# Patient Record
Sex: FEMALE | Race: WHITE | Employment: OTHER | URBAN - METROPOLITAN AREA
[De-identification: names, ages, dates, MRNs, and addresses within clinical notes are randomized per-mention and may not be internally consistent; named-entity substitution may affect disease eponyms.]

---

## 2019-05-24 ENCOUNTER — TRANSFERRED RECORDS (OUTPATIENT)
Dept: HEALTH INFORMATION MANAGEMENT | Facility: CLINIC | Age: 59
End: 2019-05-24

## 2019-05-29 ENCOUNTER — TRANSFERRED RECORDS (OUTPATIENT)
Dept: HEALTH INFORMATION MANAGEMENT | Facility: CLINIC | Age: 59
End: 2019-05-29

## 2020-08-28 ENCOUNTER — TRANSFERRED RECORDS (OUTPATIENT)
Dept: HEALTH INFORMATION MANAGEMENT | Facility: CLINIC | Age: 60
End: 2020-08-28

## 2020-09-16 ENCOUNTER — TELEPHONE (OUTPATIENT)
Dept: TRANSPLANT | Facility: CLINIC | Age: 60
End: 2020-09-16

## 2020-09-16 NOTE — TELEPHONE ENCOUNTER
Is abo B-unsure of recip's yet-recip in early referral stage.Full sibling. Lives in [place].Hx Breast CA 2013 s/p XRT no chemo. Has been taking Anastrozole.Had Lumpectomy/no Mastectomy. Will need records.Per Dr Mckee she's OK to cont process.Will at this time send info/forms so she can sign CHAGO.

## 2020-09-16 NOTE — TELEPHONE ENCOUNTER
"[facility] BREEZE  [id number]      LIVING KIDNEY DONOR EVALUATION  Donor First Name Lucero Donor MRN    Donor Last Name Javier Completed 9/10/2020 6:47 PM    1960 Record ID [id number]   BREEZE Screen PASSED     Intended Recipient  Recipient First Name Marybeth Recipient MRN    Recipient Last Name Martin Relationship Full Sibling   Recipient  1959-10-10 Recipient Diagnosis    Recipient's ABO      Donor Information  Age 60 Gender Female   Ht 157 cm (5' 2'') Race    Wt 64.8 kg (143 lbs) Ethnicity Not /   BMI 26.20 kg/m  Preferred Language English      Required No     Blood Type B   Demographics  Home Address [address] [place] [address] # [phone]   [facility] #    [facility] Code [id number] Type    Country [place] Preferred Contact day Tue   Email [email or web address] Preferred Contact time 09:00 AM-11:00 AM   &&   Donor's Medical Information  Medical History Allergic Rhinitis   Breast Cancer dx  s/p XRT no Chemo   Carpal Tunnel Syndrome   History of pregnancy   Insomnia   Joint Pain/Arthritis NOS   Menopause   Ovarian Cysts   other (\"GI issues\") Medications Anastrozole   Cetirizine   Fish Oil   Glucosamine   Melatonin Tablet   Multivitamin   Probiotics   Viactiv   Surgical History Biopsy, Left Breast   Bunionectomy, Right   Carpal Tunnel Repair, Left   Dilation and Curettage   Lumpectomy, Left Breast   Ovarian Cystectomy Allergies NKDA   Social History EtOH: Rare (1-2 drinks/month)   Illicit Drug Use: Denies   Tobacco: Denies Self-Reported Functional Status \"I am able to participate in strenuous sports such as swimming, singles tennis, football, basketball, or skiing\"   Family Medical History Cancer (denies)   Diabetes (Sibling)   Heart Disease (Mother)   Hypertension (Mother, Sibling)   Kidney Disease (Sibling, Mother)   Kidney Stones (denies) Exercise Frequency Exercise (>3 times per week)   Review of Organ Systems  Review " of Systems Airway or Lungs: No   Blood Disorder: No   Cancer: Yes   Diabetes,Thyroid,Adrenal,Endocrine Disorder: No   Digestive or Liver: No   Female Health: Yes   Heart or Circulatory System: No   Immune Diseases: No   Kidneys and Bladder: No   Muscles,Bones,Joints: Yes   Neuro: No   Psych: No   &&   Donor's Social Information  Marital Status  Living Accommodation Owns own home/apartment   Level of Education College or baccalaureate degree complete Living Arrangement With spouse   Employment Status Full Time Concerns: health and life insurance No   Employer Self Concerns: job security and lost income No   Occupation      Medical Insurance Status Has medical insurance     High Risk Behavior  High Risk Behaviors Blood transfusion < 12 months. (NO)   Commercial sex < 12 months. (NO)   Illicit IV drug use < 5yrs. (NO)   Other high risk sexual contact < 12 months. (NO)   Reason for Donation  Referral Tx Candidate Reason for Donation to help my sister   Permission to Disclose Inquiry Yes Patient Comments    Donor Motivation Level Highly motivated donor     PCP Contact  PCP Name Trace Beard MD   PCP [facility]   PCP Phone [phone]   Emergency Contact  First Name Jeremi First Name Nichole   Last Name Javier Last Name Manfred   Phone # [phone] Phone # [phone]   Phone Type Mobile Phone Type Mobile   Relationship Spouse Relationship Friend or Other   Office Use  Reviewed By    Reviewed 9/11/2020 8:36 AM   Admin Folder Accept   Comments    Lost for Followup    Extended Comments    BREEZE ID [id number] survey status completed   Activity History  Call  Due Date 9/15/2020   Last Modified Date/Time 9/15/2020 2:41 PM   Comments    Call  Due Date 9/15/2020   Last Modified Date/Time 9/15/2020 11:57 AM   Comments

## 2020-09-17 ENCOUNTER — MEDICAL CORRESPONDENCE (OUTPATIENT)
Dept: HEALTH INFORMATION MANAGEMENT | Facility: CLINIC | Age: 60
End: 2020-09-17

## 2020-12-02 ENCOUNTER — TELEPHONE (OUTPATIENT)
Dept: TRANSPLANT | Facility: CLINIC | Age: 60
End: 2020-12-02

## 2021-02-11 DIAGNOSIS — Z00.5 TRANSPLANT DONOR EVALUATION: Primary | ICD-10-CM

## 2021-02-23 ENCOUNTER — TRANSFERRED RECORDS (OUTPATIENT)
Dept: HEALTH INFORMATION MANAGEMENT | Facility: CLINIC | Age: 61
End: 2021-02-23

## 2021-03-16 ENCOUNTER — DOCUMENTATION ONLY (OUTPATIENT)
Dept: TRANSPLANT | Facility: CLINIC | Age: 61
End: 2021-03-16

## 2021-03-23 DIAGNOSIS — Z00.5 TRANSPLANT DONOR EVALUATION: Primary | ICD-10-CM

## 2021-04-01 DIAGNOSIS — Z00.5 TRANSPLANT DONOR EVALUATION: ICD-10-CM

## 2021-04-01 DIAGNOSIS — Z00.5 TRANSPLANT DONOR EVALUATION: Primary | ICD-10-CM

## 2021-04-02 DIAGNOSIS — Z00.5 TRANSPLANT DONOR EVALUATION: ICD-10-CM

## 2021-04-08 LAB
A*: NORMAL
A*LOCUS SEROLOGIC EQUIVALENT: 2
A*LOCUS: NORMAL
A*SEROLOGIC EQUIVALENT: 32
AB TEST METHOD: NORMAL
B*: NORMAL
B*LOCUS SEROLOGIC EQUIVALENT: 7
B*LOCUS: NORMAL
B*SEROLOGIC EQUIVALENT: 38
BW-1: NORMAL
BW-2: NORMAL
C*: NORMAL
C*LOCUS SEROLOGIC EQUIVALENT: 7
C*LOCUS: NORMAL
C*SEROLOGIC EQUIVALENT: 12
DPA1*: NORMAL
DPB1*: NORMAL
DPB1*LOCUS NMDP: NORMAL
DPB1*LOCUS: NORMAL
DPB1*NMDP: NORMAL
DQA1*: NORMAL
DQA1*LOCUS: NORMAL
DQB1*: NORMAL
DQB1*LOCUS SEROLOGIC EQUIVALENT: 7
DQB1*LOCUS: NORMAL
DQB1*SEROLOGIC EQUIVALENT: 5
DRB1*: NORMAL
DRB1*LOCUS SEROLOGIC EQUIVALENT: 11
DRB1*LOCUS: NORMAL
DRB1*SEROLOGIC EQUIVALENT: 1
DRB3*LOCUS SEROLOGIC EQUIVALENT: 52
DRB3*LOCUS: NORMAL
DRSSO TEST METHOD: NORMAL

## 2021-04-14 DIAGNOSIS — Z00.5 TRANSPLANT DONOR EVALUATION: Primary | ICD-10-CM

## 2021-04-28 ENCOUNTER — TRANSFERRED RECORDS (OUTPATIENT)
Dept: HEALTH INFORMATION MANAGEMENT | Facility: CLINIC | Age: 61
End: 2021-04-28

## 2021-05-05 ENCOUNTER — DOCUMENTATION ONLY (OUTPATIENT)
Dept: TRANSPLANT | Facility: CLINIC | Age: 61
End: 2021-05-05

## 2021-05-17 ENCOUNTER — TELEPHONE (OUTPATIENT)
Dept: TRANSPLANT | Facility: CLINIC | Age: 61
End: 2021-05-17

## 2021-05-17 NOTE — TELEPHONE ENCOUNTER
JEFFERSON for Johana.CrCl OK(101)per orders recip coordinator wait with further testing.Recip has appt here in 2 wks and will let us know if we should cont.

## 2021-06-03 ENCOUNTER — TELEPHONE (OUTPATIENT)
Dept: TRANSPLANT | Facility: CLINIC | Age: 61
End: 2021-06-03

## 2021-06-03 DIAGNOSIS — Z00.5 TRANSPLANT DONOR EVALUATION: Primary | ICD-10-CM

## 2021-06-03 RX ORDER — ALBUTEROL SULFATE 0.83 MG/ML
2.5 SOLUTION RESPIRATORY (INHALATION)
Status: CANCELLED | OUTPATIENT
Start: 2021-07-02

## 2021-06-03 RX ORDER — METHYLPREDNISOLONE SODIUM SUCCINATE 125 MG/2ML
125 INJECTION, POWDER, LYOPHILIZED, FOR SOLUTION INTRAMUSCULAR; INTRAVENOUS
Status: CANCELLED
Start: 2021-07-02

## 2021-06-03 RX ORDER — ALBUTEROL SULFATE 90 UG/1
1-2 AEROSOL, METERED RESPIRATORY (INHALATION)
Status: CANCELLED
Start: 2021-07-02

## 2021-06-03 RX ORDER — EPINEPHRINE 1 MG/ML
0.3 INJECTION, SOLUTION, CONCENTRATE INTRAVENOUS EVERY 5 MIN PRN
Status: CANCELLED | OUTPATIENT
Start: 2021-07-02

## 2021-06-03 RX ORDER — MEPERIDINE HYDROCHLORIDE 25 MG/ML
25 INJECTION INTRAMUSCULAR; INTRAVENOUS; SUBCUTANEOUS EVERY 30 MIN PRN
Status: CANCELLED | OUTPATIENT
Start: 2021-07-02

## 2021-06-03 RX ORDER — NALOXONE HYDROCHLORIDE 0.4 MG/ML
0.2 INJECTION, SOLUTION INTRAMUSCULAR; INTRAVENOUS; SUBCUTANEOUS
Status: CANCELLED | OUTPATIENT
Start: 2021-07-02

## 2021-06-03 RX ORDER — DIPHENHYDRAMINE HYDROCHLORIDE 50 MG/ML
50 INJECTION INTRAMUSCULAR; INTRAVENOUS
Status: CANCELLED
Start: 2021-07-02

## 2021-06-04 DIAGNOSIS — Z00.5 TRANSPLANT DONOR EVALUATION: Primary | ICD-10-CM

## 2021-06-09 ENCOUNTER — TRANSFERRED RECORDS (OUTPATIENT)
Dept: HEALTH INFORMATION MANAGEMENT | Facility: CLINIC | Age: 61
End: 2021-06-09

## 2021-06-20 ENCOUNTER — HEALTH MAINTENANCE LETTER (OUTPATIENT)
Age: 61
End: 2021-06-20

## 2021-06-29 ENCOUNTER — TELEPHONE (OUTPATIENT)
Dept: TRANSPLANT | Facility: CLINIC | Age: 61
End: 2021-06-29

## 2021-06-29 NOTE — TELEPHONE ENCOUNTER
Talked with Salome on the phone regarding donor education and evaluation of a living kidney donor.  I reviewed donor consent.  I answered all questions.  I will send donor consent and three ROIs for signatures so that we can retrieve medical records regarding her history of breast cancer, her most recent pap and most recent colonoscopy.  Has offered to be donor to biological sister who is 18 months younger.  Salome is open to PEP  Discussed surgery hospitalization and recovery.  Know when and how to obtain evaluation results and the process for scheduling surgery.  [facility].  Donor was Provided Living Donor Collective (LDC) Materials? Yes  Donor has agreed to be contacted by [facility] for follow-up LDC Questions? Yes  Requested that Donor consent be sent via [place]  Requested three CHAGO's be sent to Salome  Will request  routine cancer screening tests once we receive CHAGO's   Questions answered.  Approx. time spent:  30 minutes  Donor has medical insurance:  Yes    Salome lives in [place].  She had breast cancer in 2013. Neg BRCA according to donor.    She is on Anastrozole for 10 years.  She has yearly follow ups with her oncology team.  She is due in September

## 2021-07-01 ENCOUNTER — TELEPHONE (OUTPATIENT)
Dept: TRANSPLANT | Facility: CLINIC | Age: 61
End: 2021-07-01

## 2021-07-02 ENCOUNTER — TELEPHONE (OUTPATIENT)
Dept: TRANSPLANT | Facility: CLINIC | Age: 61
End: 2021-07-02

## 2021-07-02 NOTE — TELEPHONE ENCOUNTER
Please RESCHEDULE for kidney donor eval for 7/16/21 slot 2.Will have Iohexol and labs that day at [facility] lab.COVID test outside clinic. Her orders are in from prev.She has MyChart.

## 2021-07-14 ENCOUNTER — TELEPHONE (OUTPATIENT)
Dept: TRANSPLANT | Facility: CLINIC | Age: 61
End: 2021-07-14

## 2021-07-14 NOTE — TELEPHONE ENCOUNTER
Outreach call made to Nahomi to review plan for her evaluation. Evaluation process briefly reviewed. Nahomi verbalized understanding and comfort with plan. She has travel arrangements in place for today. Nahomi confirmed he had her pre-procedure COVID testing done and is bringing the results.

## 2021-07-15 DIAGNOSIS — Z00.5 TRANSPLANT DONOR EVALUATION: ICD-10-CM

## 2021-07-15 NOTE — TELEPHONE ENCOUNTER
Nahomi called to verify okay to bring her sister as a support person to her evaluation, sister is her intended recipient. Told Nahomi okay to have her sister if she is her desired support person, and informed her that her sister may be asked to step out of the room for portions of visits. Nahomi verbalized understanding, denied further questions.

## 2021-07-16 ENCOUNTER — APPOINTMENT (OUTPATIENT)
Dept: TRANSPLANT | Facility: CLINIC | Age: 61
End: 2021-07-16
Attending: TRANSPLANT SURGERY

## 2021-07-16 ENCOUNTER — OFFICE VISIT (OUTPATIENT)
Dept: NEPHROLOGY | Facility: CLINIC | Age: 61
End: 2021-07-16
Attending: TRANSPLANT SURGERY

## 2021-07-16 ENCOUNTER — LAB (OUTPATIENT)
Dept: LAB | Facility: CLINIC | Age: 61
End: 2021-07-16

## 2021-07-16 ENCOUNTER — ALLIED HEALTH/NURSE VISIT (OUTPATIENT)
Dept: TRANSPLANT | Facility: CLINIC | Age: 61
End: 2021-07-16
Attending: TRANSPLANT SURGERY

## 2021-07-16 ENCOUNTER — LAB (OUTPATIENT)
Dept: LAB | Facility: CLINIC | Age: 61
End: 2021-07-16
Attending: INTERNAL MEDICINE

## 2021-07-16 ENCOUNTER — ANCILLARY PROCEDURE (OUTPATIENT)
Dept: CARDIOLOGY | Facility: CLINIC | Age: 61
End: 2021-07-16
Attending: INTERNAL MEDICINE

## 2021-07-16 ENCOUNTER — OFFICE VISIT (OUTPATIENT)
Dept: INFUSION THERAPY | Facility: CLINIC | Age: 61
End: 2021-07-16
Attending: INTERNAL MEDICINE

## 2021-07-16 ENCOUNTER — ANCILLARY PROCEDURE (OUTPATIENT)
Dept: CT IMAGING | Facility: CLINIC | Age: 61
End: 2021-07-16
Attending: INTERNAL MEDICINE

## 2021-07-16 ENCOUNTER — ANCILLARY PROCEDURE (OUTPATIENT)
Dept: GENERAL RADIOLOGY | Facility: CLINIC | Age: 61
End: 2021-07-16
Attending: INTERNAL MEDICINE

## 2021-07-16 ENCOUNTER — OFFICE VISIT (OUTPATIENT)
Dept: TRANSPLANT | Facility: CLINIC | Age: 61
End: 2021-07-16
Attending: TRANSPLANT SURGERY

## 2021-07-16 VITALS
TEMPERATURE: 97 F | RESPIRATION RATE: 16 BRPM | OXYGEN SATURATION: 96 % | HEART RATE: 55 BPM | SYSTOLIC BLOOD PRESSURE: 158 MMHG | DIASTOLIC BLOOD PRESSURE: 88 MMHG | WEIGHT: 150.7 LBS

## 2021-07-16 VITALS — BODY MASS INDEX: 27.6 KG/M2 | WEIGHT: 150 LBS | HEART RATE: 47 BPM | HEIGHT: 62 IN | OXYGEN SATURATION: 100 %

## 2021-07-16 DIAGNOSIS — Z00.5 EXAMINATION OF POTENTIAL DONOR OF ORGAN AND TISSUE: Primary | ICD-10-CM

## 2021-07-16 DIAGNOSIS — Z00.5 TRANSPLANT DONOR EVALUATION: ICD-10-CM

## 2021-07-16 DIAGNOSIS — Z00.5 TRANSPLANT DONOR EVALUATION: Primary | ICD-10-CM

## 2021-07-16 DIAGNOSIS — Z00.5 EXAMINATION OF POTENTIAL DONOR OF ORGAN AND TISSUE: ICD-10-CM

## 2021-07-16 DIAGNOSIS — C50.912 MALIGNANT NEOPLASM OF LEFT BREAST IN FEMALE, ESTROGEN RECEPTOR POSITIVE, UNSPECIFIED SITE OF BREAST (H): ICD-10-CM

## 2021-07-16 DIAGNOSIS — R03.0 ELEVATED BLOOD PRESSURE READING: ICD-10-CM

## 2021-07-16 DIAGNOSIS — Z01.818 ENCOUNTER FOR PREOPERATIVE EVALUATION OF LIVING DONOR OF KIDNEY: Primary | ICD-10-CM

## 2021-07-16 DIAGNOSIS — Z17.0 MALIGNANT NEOPLASM OF LEFT BREAST IN FEMALE, ESTROGEN RECEPTOR POSITIVE, UNSPECIFIED SITE OF BREAST (H): ICD-10-CM

## 2021-07-16 DIAGNOSIS — R31.1 BENIGN ESSENTIAL MICROSCOPIC HEMATURIA: ICD-10-CM

## 2021-07-16 LAB
ABO/RH(D): NORMAL
ALBUMIN SERPL-MCNC: 3.7 G/DL (ref 3.4–5)
ALBUMIN UR-MCNC: NEGATIVE MG/DL
ALP SERPL-CCNC: 61 U/L (ref 40–150)
ALT SERPL W P-5'-P-CCNC: 20 U/L (ref 0–50)
ANION GAP SERPL CALCULATED.3IONS-SCNC: 4 MMOL/L (ref 3–14)
ANTIBODY SCREEN: NEGATIVE
APPEARANCE UR: ABNORMAL
APTT PPP: 31 SECONDS (ref 22–38)
AST SERPL W P-5'-P-CCNC: 20 U/L (ref 0–45)
BILIRUB DIRECT SERPL-MCNC: 0.1 MG/DL (ref 0–0.2)
BILIRUB SERPL-MCNC: 0.5 MG/DL (ref 0.2–1.3)
BILIRUB UR QL STRIP: NEGATIVE
BUN SERPL-MCNC: 13 MG/DL (ref 7–30)
CALCIUM SERPL-MCNC: 9.1 MG/DL (ref 8.5–10.1)
CHLORIDE BLD-SCNC: 110 MMOL/L (ref 94–109)
CHOLEST SERPL-MCNC: 201 MG/DL
CO2 SERPL-SCNC: 25 MMOL/L (ref 20–32)
COLOR UR AUTO: YELLOW
CREAT SERPL-MCNC: 0.74 MG/DL (ref 0.52–1.04)
CREAT SERPL-MCNC: 0.75 MG/DL (ref 0.52–1.04)
CREAT UR-MCNC: 106 MG/DL
CREAT UR-MCNC: 106 MG/DL
ERYTHROCYTE [DISTWIDTH] IN BLOOD BY AUTOMATED COUNT: 13.2 % (ref 10–15)
FASTING STATUS PATIENT QL REPORTED: YES
GFR SERPL CREATININE-BSD FRML MDRD: 86 ML/MIN/1.73M2
GFR SERPL CREATININE-BSD FRML MDRD: 87 ML/MIN/1.73M2
GLUCOSE BLD-MCNC: 89 MG/DL (ref 70–99)
GLUCOSE BLD-MCNC: 92 MG/DL (ref 70–99)
GLUCOSE UR STRIP-MCNC: NEGATIVE MG/DL
HBA1C MFR BLD: 5.4 % (ref 0–5.6)
HCT VFR BLD AUTO: 36.8 % (ref 35–47)
HDLC SERPL-MCNC: 67 MG/DL
HGB BLD-MCNC: 12.6 G/DL (ref 11.7–15.7)
HGB BLD-MCNC: 12.6 G/DL (ref 11.7–15.7)
HGB UR QL STRIP: NEGATIVE
INR PPP: 1.08 (ref 0.85–1.15)
KETONES UR STRIP-MCNC: NEGATIVE MG/DL
LDLC SERPL CALC-MCNC: 119 MG/DL
LEUKOCYTE ESTERASE UR QL STRIP: NEGATIVE
LVEF ECHO: NORMAL
MCH RBC QN AUTO: 31.9 PG (ref 26.5–33)
MCHC RBC AUTO-ENTMCNC: 34.4 G/DL (ref 31.5–36.5)
MCV RBC AUTO: 94 FL (ref 78–100)
MICROALBUMIN UR-MCNC: 12 MG/DL
MICROALBUMIN/CREAT UR: 11.32 MG/G CR (ref 0–25)
NITRATE UR QL: NEGATIVE
NONHDLC SERPL-MCNC: 134 MG/DL
PH UR STRIP: 7 [PH] (ref 5–7)
PHOSPHATE SERPL-MCNC: 3.9 MG/DL (ref 2.5–4.5)
PLATELET # BLD AUTO: 205 10E3/UL (ref 150–450)
POTASSIUM BLD-SCNC: 3.6 MMOL/L (ref 3.4–5.3)
PROT SERPL-MCNC: 7.1 G/DL (ref 6.8–8.8)
PROT UR-MCNC: 0.08 G/L
PROT/CREAT 24H UR: 0.08 G/G CR (ref 0–0.2)
RBC # BLD AUTO: 3.92 10E6/UL (ref 3.8–5.2)
RBC URINE: 7 /HPF
SODIUM SERPL-SCNC: 139 MMOL/L (ref 133–144)
SP GR UR STRIP: 1.01 (ref 1–1.03)
SPECIMEN EXPIRATION DATE: NORMAL
TRIGL SERPL-MCNC: 73 MG/DL
URATE SERPL-MCNC: 4.1 MG/DL (ref 2.6–6)
UROBILINOGEN UR STRIP-MCNC: NORMAL MG/DL
WBC # BLD AUTO: 3.3 10E3/UL (ref 4–11)
WBC URINE: <1 /HPF

## 2021-07-16 PROCEDURE — 84156 ASSAY OF PROTEIN URINE: CPT

## 2021-07-16 PROCEDURE — 82040 ASSAY OF SERUM ALBUMIN: CPT

## 2021-07-16 PROCEDURE — 82248 BILIRUBIN DIRECT: CPT

## 2021-07-16 PROCEDURE — 82947 ASSAY GLUCOSE BLOOD QUANT: CPT

## 2021-07-16 PROCEDURE — 86481 TB AG RESPONSE T-CELL SUSP: CPT | Mod: 90 | Performed by: PATHOLOGY

## 2021-07-16 PROCEDURE — 250N000011 HC RX IP 250 OP 636: Performed by: INTERNAL MEDICINE

## 2021-07-16 PROCEDURE — 86900 BLOOD TYPING SEROLOGIC ABO: CPT | Mod: 90 | Performed by: PATHOLOGY

## 2021-07-16 PROCEDURE — 86704 HEP B CORE ANTIBODY TOTAL: CPT | Mod: 90 | Performed by: PATHOLOGY

## 2021-07-16 PROCEDURE — 99245 OFF/OP CONSLTJ NEW/EST HI 55: CPT

## 2021-07-16 PROCEDURE — 81001 URINALYSIS AUTO W/SCOPE: CPT | Performed by: PATHOLOGY

## 2021-07-16 PROCEDURE — 86803 HEPATITIS C AB TEST: CPT | Mod: 90 | Performed by: PATHOLOGY

## 2021-07-16 PROCEDURE — 87340 HEPATITIS B SURFACE AG IA: CPT | Mod: 90 | Performed by: PATHOLOGY

## 2021-07-16 PROCEDURE — 74174 CTA ABD&PLVS W/CONTRAST: CPT | Performed by: RADIOLOGY

## 2021-07-16 PROCEDURE — 71046 X-RAY EXAM CHEST 2 VIEWS: CPT | Mod: GC | Performed by: RADIOLOGY

## 2021-07-16 PROCEDURE — 36415 COLL VENOUS BLD VENIPUNCTURE: CPT | Performed by: PATHOLOGY

## 2021-07-16 PROCEDURE — 86901 BLOOD TYPING SEROLOGIC RH(D): CPT | Mod: 90 | Performed by: PATHOLOGY

## 2021-07-16 PROCEDURE — 86644 CMV ANTIBODY: CPT | Mod: 90 | Performed by: PATHOLOGY

## 2021-07-16 PROCEDURE — G0463 HOSPITAL OUTPT CLINIC VISIT: HCPCS | Mod: 25,27

## 2021-07-16 PROCEDURE — 84550 ASSAY OF BLOOD/URIC ACID: CPT

## 2021-07-16 PROCEDURE — 85730 THROMBOPLASTIN TIME PARTIAL: CPT | Performed by: PATHOLOGY

## 2021-07-16 PROCEDURE — 36415 COLL VENOUS BLD VENIPUNCTURE: CPT

## 2021-07-16 PROCEDURE — 80061 LIPID PANEL: CPT

## 2021-07-16 PROCEDURE — 93306 TTE W/DOPPLER COMPLETE: CPT | Mod: GC | Performed by: INTERNAL MEDICINE

## 2021-07-16 PROCEDURE — 86850 RBC ANTIBODY SCREEN: CPT | Mod: 90 | Performed by: PATHOLOGY

## 2021-07-16 PROCEDURE — 85027 COMPLETE CBC AUTOMATED: CPT

## 2021-07-16 PROCEDURE — 86780 TREPONEMA PALLIDUM: CPT | Mod: 90 | Performed by: PATHOLOGY

## 2021-07-16 PROCEDURE — 96374 THER/PROPH/DIAG INJ IV PUSH: CPT

## 2021-07-16 PROCEDURE — G0463 HOSPITAL OUTPT CLINIC VISIT: HCPCS | Mod: 25

## 2021-07-16 PROCEDURE — 86706 HEP B SURFACE ANTIBODY: CPT | Mod: 90 | Performed by: PATHOLOGY

## 2021-07-16 PROCEDURE — 81001 URINALYSIS AUTO W/SCOPE: CPT

## 2021-07-16 PROCEDURE — 83036 HEMOGLOBIN GLYCOSYLATED A1C: CPT

## 2021-07-16 PROCEDURE — 82043 UR ALBUMIN QUANTITATIVE: CPT

## 2021-07-16 PROCEDURE — 99204 OFFICE O/P NEW MOD 45 MIN: CPT | Performed by: TRANSPLANT SURGERY

## 2021-07-16 PROCEDURE — 84100 ASSAY OF PHOSPHORUS: CPT

## 2021-07-16 PROCEDURE — 85610 PROTHROMBIN TIME: CPT | Performed by: PATHOLOGY

## 2021-07-16 PROCEDURE — 86665 EPSTEIN-BARR CAPSID VCA: CPT | Mod: 90 | Performed by: PATHOLOGY

## 2021-07-16 PROCEDURE — 82565 ASSAY OF CREATININE: CPT

## 2021-07-16 RX ORDER — METHYLPREDNISOLONE SODIUM SUCCINATE 125 MG/2ML
125 INJECTION, POWDER, LYOPHILIZED, FOR SOLUTION INTRAMUSCULAR; INTRAVENOUS
Status: CANCELLED
Start: 2021-07-16

## 2021-07-16 RX ORDER — ALBUTEROL SULFATE 90 UG/1
1-2 AEROSOL, METERED RESPIRATORY (INHALATION)
Status: CANCELLED
Start: 2021-07-16

## 2021-07-16 RX ORDER — IOPAMIDOL 755 MG/ML
100 INJECTION, SOLUTION INTRAVASCULAR ONCE
Status: COMPLETED | OUTPATIENT
Start: 2021-07-16 | End: 2021-07-16

## 2021-07-16 RX ORDER — NALOXONE HYDROCHLORIDE 0.4 MG/ML
0.2 INJECTION, SOLUTION INTRAMUSCULAR; INTRAVENOUS; SUBCUTANEOUS
Status: CANCELLED | OUTPATIENT
Start: 2021-07-16

## 2021-07-16 RX ORDER — ALBUTEROL SULFATE 0.83 MG/ML
2.5 SOLUTION RESPIRATORY (INHALATION)
Status: CANCELLED | OUTPATIENT
Start: 2021-07-16

## 2021-07-16 RX ORDER — CHLORAL HYDRATE 500 MG
1 CAPSULE ORAL 2 TIMES DAILY
COMMUNITY

## 2021-07-16 RX ORDER — MEPERIDINE HYDROCHLORIDE 25 MG/ML
25 INJECTION INTRAMUSCULAR; INTRAVENOUS; SUBCUTANEOUS EVERY 30 MIN PRN
Status: CANCELLED | OUTPATIENT
Start: 2021-07-16

## 2021-07-16 RX ORDER — EPINEPHRINE 1 MG/ML
0.3 INJECTION, SOLUTION INTRAMUSCULAR; SUBCUTANEOUS EVERY 5 MIN PRN
Status: CANCELLED | OUTPATIENT
Start: 2021-07-16

## 2021-07-16 RX ORDER — CETIRIZINE HYDROCHLORIDE 5 MG/1
5 TABLET ORAL DAILY
COMMUNITY

## 2021-07-16 RX ORDER — DIPHENHYDRAMINE HYDROCHLORIDE 50 MG/ML
50 INJECTION INTRAMUSCULAR; INTRAVENOUS
Status: CANCELLED
Start: 2021-07-16

## 2021-07-16 RX ADMIN — IOHEXOL 5 ML: 300 INJECTION, SOLUTION INTRAVENOUS at 08:19

## 2021-07-16 RX ADMIN — IOPAMIDOL 100 ML: 755 INJECTION, SOLUTION INTRAVASCULAR at 13:40

## 2021-07-16 ASSESSMENT — MIFFLIN-ST. JEOR: SCORE: 1196.9

## 2021-07-16 ASSESSMENT — PAIN SCALES - GENERAL: PAINLEVEL: NO PAIN (0)

## 2021-07-16 NOTE — NURSING NOTE
Saw Lucero Javier in clinic during kidney donor evaluation.  Her sister Marybeth came with her for this visit.  Has offered to be donor to sister Marybeth Martin.  Compatibility testing was completed, okay for direct donation.    Discussed surgery hospitalization and recovery.  Knows when and how to obtain evaluation results and the process for scheduling surgery.  Has received and reviewed the donor education materials including Living Donor Research synopsis sheet, Donor Shield pamphlet, Informed Consent for Kidney Donor.  Reviewed [facility] datasheet.  Donor was Provided Living Donor Collective (LDC) Materials? Yes  Donor has agreed to be contacted by [facility] for follow-up LDC Questions? Yes  Signed consent for donor evaluation.  Donor signed CHAGO.  Medical history of Breast Cancer 2013, takes Anastrozole.  She had lumpectomy.  No Chemo.    Medical history of heart murmer.  Today there is echocardiogram scheduled.  After seeing the patient Dr Queen would recommend the patient to have a stress test.  Clinic was unable to change procedure today.  Also needed is 24 hour Blood pressure monitor as the patient had  elevated BP's today in clinic.  Requested routine cancer screening tests:  Pap  Mammography  Colonoscopy  Questions answered. Approx. time spent: 20minutes.    Donor has medical insurance Yes   Parking pass given to patient.    Madonna Weber RN  Living Donor Coordinator  07/16/2021 2:06 PM

## 2021-07-16 NOTE — LETTER
7/16/2021         RE: Lucero Herrera  [address] [facility] [address]        Dear Colleague,    Thank you for referring your patient, Lucero Herrera, to the [facility]. Please see a copy of my visit note below.    [facility]  Consult Note     Medical record number: [id number]  YOB: 1960,     Date of Visit:  07/16/2021  Consult requested by the patient for evaluation of kidney donation candidacy.    Assessment and Recommendations: Ms. Herrera appears to be a good candidate for kidney donation at this point in the evaluation. The following issues will need to be addressed prior to formal review:  Iohexal GFR  CT angio  Obtain medical records       The risks of the surgical procedure including but not limited to the rare risk of mortality discussed in detail. Patient verbalized good understanding and had several pertinent questions which were answered.      The majority of our visit today was spent in counseling regarding the medical and surgical risks of kidney donation; the typical lorrie-and post-operative experience and recovery/return to work pattern; restrictions related to the surgery (driving; lifting; exercise).      We also talked about post-op visits and longer term health care maintenance, as well as the implications of having one remaining kidney. This discussion included, but was not limited to rates of complications such as bleeding, infection, need for transfusion, reoperation, other organ injury, future bowel obstruction, incisional hernia, port site pain, venous thrombosis, pulmonary embolism, renal failure, and death (3 per 10,000).     We discussed long-term risks in detail.  I discussed the articles suggesting a small increase in ESKD in donors and their limitations    We discussed recovery, including length of hospital stay; no new meds other than pain meds on discharge; limitations after surgery (no driving for a  couple of weeks; no lifting over 10 lbs or exercise stretching abdominal muscles for 6 weeks; and fatigue for the first few weeks postdonation.  I informed her of our donor survey results on donors feeling ready to drive, and on return to feeling back to normal.  We also discussed the need for maintaining a healthy lifestyle long-term after donation  We discussed the national paired system and the possibility of participating, if not a match for the intended recipient.  I explained how the system worked.    We discussed the increased risk for venous thrombosis for the 1st two postoperative risks.  We discussed that if the family were to drive home in the 1st 2 weeks, the  should stop approximately every 40 minutes and she should get out of the car and walk around for a couple of minutes.    She had numerous questions about the details of the surgical procedure which we discussed in detail.   At the conclusion of the visit, all questions had been answered and Ms. Herrera's candidacy for donation will be reviewed at our Multidisciplinary Donor Selection Committee.     Total time: 45 minutes  Counselling time: 35 minutes        Edison Butler MD  Surgical Director, Kidney Transplantation                                                                                                        ---------------------------------------------------------------------------------------------------    HPI: Ms. Herrera wishes to donate a kidney to sibling.  PMH: breast cancer 2013       Past Medical History:   Diagnosis Date     Arthritis      Breast cancer (H) 2013    Teatment at [place], [place]     CTS (carpal tunnel syndrome)      Heart murmur      Ovarian cyst     benign (per pt)     Past Surgical History:   Procedure Laterality Date     LUMPECTOMY BREAST       OOPHORECTOMY       OVARIAN CYST REMOVAL       Family History   Problem Relation Age of Onset     Cancer Father      Diabetes Sister       Kidney Disease Sister      Social History     Socioeconomic History     Marital status:      Spouse name: Not on file     Number of children: Not on file     Years of education: Not on file     Highest education level: Not on file   Occupational History     Not on file   Tobacco Use     Smoking status: Never Smoker     Smokeless tobacco: Never Used   Substance and Sexual Activity     Alcohol use: Yes     Comment: 2 drinks a week     Drug use: Never     Sexual activity: Not on file   Other Topics Concern     Not on file   Social History Narrative     Not on file     Social Determinants of Health     Financial Resource Strain:      Difficulty of Paying Living Expenses:    Food Insecurity:      Worried About Running Out of Food in the Last Year:      Ran Out of Food in the Last Year:    Transportation Needs:      Lack of Transportation (Medical):      Lack of Transportation (Non-Medical):    Physical Activity:      Days of Exercise per Week:      Minutes of Exercise per Session:    Stress:      Feeling of Stress :    Social Connections:      Frequency of Communication with Friends and Family:      Frequency of Social Gatherings with Friends and Family:      Attends Denominational Services:      Active Member of Clubs or Organizations:      Attends Club or Organization Meetings:      Marital Status:    Intimate Partner Violence:      Fear of Current or Ex-Partner:      Emotionally Abused:      Physically Abused:      Sexually Abused:        ROS:   CONSTITUTIONAL:  No fevers or chills  EYES: negative for icterus  ENT:  negative for hearing loss, tinnitus and sore throat  RESPIRATORY:  negative for cough, sputum, dyspnea  CARDIOVASCULAR:  negative for chest pain  GASTROINTESTINAL:  negative for nausea, vomiting, diarrhea or constipation  GENITOURINARY:  negative for incontinence, dysuria, bladder emptying problems  HEME:  No easy bruising  INTEGUMENT:  negative for rash and pruritus  NEURO:  Negative for headache,  seizure disorder    Allergies:   No Known Allergies    Medications:  Prescription Medications as of 8/2/2021       Rx Number Disp Refills Start End Last Dispensed Date Next Fill Date Owning Pharmacy    ANASTROZOLE PO            Sig: Take by mouth daily    Class: Historical    Route: Oral    calcium-vitamin D-vitamin K (VIACTIV) 500-500-40 MG-UNT-MCG CHEW            Sig: Take 1 tablet by mouth 2 times daily    Class: Historical    Route: Oral    cetirizine (ZYRTEC) 5 MG tablet            Sig: Take 5 mg by mouth daily    Class: Historical    Route: Oral    fish oil-omega-3 fatty acids 1000 MG capsule            Sig: Take 1 g by mouth 2 times daily    Class: Historical    Route: Oral    Glucosamine Sulfate 1500 MG PACK            Sig: Take 1,500 mg by mouth 2 times daily    Class: Historical    Route: Oral    Pediatric Multivitamins-Fl (MULTIVITAMIN WITH 1 MG FLUORIDE) 1 MG CHEW            Sig: Take 1 tablet by mouth daily    Class: Historical    Route: Oral          Labs:   ABO: B POS  Chemistries:   Recent Labs   Lab Test 07/16/21  0729      POTASSIUM 3.6   CHLORIDE 110*   CO2 25   ANIONGAP 4   GLC 89  92   BUN 13   CR 0.75  0.74   SHERIE 9.1       Urine Studies:   Recent Labs   Lab Test 07/16/21  1321 07/16/21  0738   COLOR Straw Yellow   APPEARANCE Clear Slightly Cloudy*   URINEGLC Negative Negative   URINEBILI Negative Negative   URINEKETONE Negative Negative   SG 1.004 1.014   UBLD Negative Negative   URINEPH 7.0 7.0   PROTEIN Negative Negative   NITRITE Negative Negative   LEUKEST Negative Negative   RBCU 2 7*   WBCU 1 <1     Recent Labs   Lab Test 07/16/21  0738   UTPG 0.08   MICROL 12       Hematology:      Recent Labs   Lab Test 07/16/21  0729   WBC 3.3*   RBC 3.92   HGB 12.6  12.6   HCT 36.8   MCV 94   MCH 31.9   MCHC 34.4   RDW 13.2          Coags:   Recent Labs   Lab Test 07/16/21  1303   INR 1.08       Lipid Profile:   Cholesterol   Date Value Ref Range Status   07/16/2021 201 (H) <200 mg/dL  Final     Comment:     Age 0-19 years  Desirable: <170 mg/dL  Borderline high:  170-199 mg/dl  High:            >199 mg/dl    Age 20 years and older  Desirable: <200 mg/dL     Triglycerides   Date Value Ref Range Status   07/16/2021 73 <150 mg/dL Final     Comment:     0-9 years:  Normal:    Less than 75 mg/dL  Borderline high:  75-99 mg/dL  High:             Greater than or equal to 100 mg/dL    0-19 years:  Normal:    Less than 90 mg/dL  Borderline high:   mg/dL  High:             Greater than or equal to 130 mg/dL    20 years and older:  Normal:    Less than 150 mg/dL  Borderline high:  150-199 mg/dL  High:             200-499 mg/dL  Very high:   Greater than or equal to 500 mg/dL     Direct Measure HDL   Date Value Ref Range Status   07/16/2021 67 >=50 mg/dL Final     Comment:     0-19 years:       Greater than or equal to 45 mg/dL   Low: Less than 40 mg/dL   Borderline low: 40-44 mg/dL     20 years and older:   Female: Greater than or equal to 50 mg/dL   Male:   Greater than or equal to 40 mg/dL          LDL Cholesterol Calculated   Date Value Ref Range Status   07/16/2021 119 (H) <=100 mg/dL Final     Comment:     Age 0-19 years:  Desirable: 0-110 mg/dL   Borderline high: 110-129 mg/dL   High: >= 130 mg/dL    Age 20 years and older:  Desirable: <100mg/dL  Above desirable: 100-129 mg/dL   Borderline high: 130-159 mg/dL   High: 160-189 mg/dL   Very high: >= 190 mg/dL     Non HDL Cholesterol   Date Value Ref Range Status   07/16/2021 134 (H) <130 mg/dL Final     Comment:     0-19 years:  Desirable:        Less than 120 mg/dL  Borderline high:  120-144 mg/dL  High:                 Greater than or equal to 145 mg/dL    20 years and older:  Desirable:        130 mg/dL  Above Desirable:130-159 mg/dL  Borderline high:  160-189 mg/dL  High:             190-219 mg/dL  Very high:   Greater than or equal to 220 mg/dL       Virals:  CMV and EBV pending.     Recent Labs   Lab Test 07/16/21  1303   HBCAB Nonreactive    HEPBANG Nonreactive        Hepatitis C Antibody   Date Value Ref Range Status   07/16/2021 Nonreactive Nonreactive Final       Hepatitis C Antibody   Date Value Ref Range Status   07/16/2021 Nonreactive Nonreactive Final             Again, thank you for allowing me to participate in the care of your patient.        Sincerely,        Edison Butler MD

## 2021-07-16 NOTE — PROGRESS NOTES
Donor Iohexol test    Lucero Herrera presents today to [facility] for a Donor Iohexol test.      Progress note:  ID verified by name and .     The following information was verified with the patient:  Female Patients is there any possibility of being pregnant No  Is there a history of allergy (skin rash, swelling, ect) to:   A.  Iodine (except skin reactions to betadine): No   B. Intravenous radio-contrast agents: No   C. Seafood No     present during visit today: Not Applicable.    R.N. provided patient with educational handout regarding timed test. Yes    butterfly placed in right AC and Iohexol administered over 2 minutes.  Positive blood return verified before and after injection. Butterfly needle removed.  20 gauge PIV placed in 2nd floor lab in left ac  for blood draws and CT this afternoon.    Medication administered:  Iohexol (Omnipaque 300mg iodine/ml concentration) 5 mls.    Start time: 819  Stop time: 821    Drug Waste Record    Drug Name: iohexol  Dose: 5ml  Route administered: IV  NDC #: 0407--1413-10  Amount of waste(mL):5ml  Reason for waste: Single use vial    Administrations This Visit     iohexol (OMNIPAQUE 300) injection 5 mL     Admin Date  2021 Action  Given Dose  5 mL Route  Intravenous Administered By  Desirae Martinez RN                    Evaluation nurse in transplant to draw labs at 2 and 4 hours post iohexol administration.  Patient given a slip with the times to get labs drawn and verbalized understanding of the plan.    Patient tolerated the procedure:  Yes    After the infusion patient was discharged to the next appointment.    Desirae Martinez RN

## 2021-07-16 NOTE — NURSING NOTE
Chief Complaint   Patient presents with     Blood Draw     Labs drawn via PIV by RN in lab. VS taken       Labs drawn from PIV placed by RN. Line flushed with saline. Vitals taken. Pt checked in for appointment(s).    Ember Burton RN

## 2021-07-16 NOTE — PROGRESS NOTES
[facility] Solid Organ Transplant  Outpatient MNT: Kidney Donor Evaluation    Current BMI: 27.2 (HT 62 in,  lbs/68 kg)  BMI is within recommendation of <30 for kidney donation    8 Year Estimated Risk of T2DM  </= 3%     Time Spent: 15 minutes  Visit Type: Initial   Referring Physician: Luke  Pt accompanied by: her sister (potential recipient)    Nutrition Assessment  Doesn't eat red meat.    Vitamins, Supplements, Pertinent Meds: calcium vit D vit K (viactiv), fish oil, MVI, glucosamine   Herbal Medicines/Supplements: none     Weight hx: stable     Food Security: any concerns about having enough money to buy food or access to grocery stores? No     Diet Recall  Breakfast Protein shake (hemp powder, gemma, ladonna seeds, cocoa powder with 1/2 banana and almond milk, cinnamon); less often scrambled eggs with veggies; less often cashew milk yogurt with fruit    Lunch Homemade soup/canned soup; gazpacho with dinner roll from grocery store    Dinner Salad with tuna/veggie burger/fish and nuts (almonds); fish with veggies and quinoa/rice    Snacks Popcorn, cashews    Beverages Water with lemon, seasonal hot tea    Alcohol A few drinks/week   Dining out 1x/week     Physical Activity  Daily aerobic and strength training- 60 minutes   Bikes 1x/week, additional walking      Labs  Chol 201 TG 73 HDL 67      FBG = 92  A1c = 5.4  BP = high x 1 in clinic today, wnl in March    Prediction of Incident Diabetes Mellitus in Middle-aged Adults: The [place] Offspring Study  Denilson Dan MD; James B. Meigs, MD, MPH; Elizabeth Rosales, PhD; Mari Yanez MD, MPH; Trace Aviles MD; Cuauhtemoc Dang Sr,   PhD  Pt's estimated risk for T2DM (per Table 6 above)  Pt received points for the following criteria: BMI>25, BP   Total points: 4  8-Year estimated risk of T2DM: </= 3%    Nutrition Diagnosis  No nutrition diagnosis identified at this time.    Nutrition Intervention  Nutrition education  provided:  Reviewed overall healthy diet guidelines for pre and post kidney donation. Discussed maintenance of a healthy weight and Na+ intake <3000 mg/day (<2000 mg/day if HTN).    Avoid the following post op d/t unknown effects on the organs:  - Herbal, Chinese, holistic, chiropractic, natural, alternative medicines and supplements  - Detoxes and cleanses  - Weight loss pills  - Protein powders or other products with extracts or herbs (ie green tea extract)    Patient Understanding: Pt verbalized understanding of education provided.  Expected Engagement: Good  Follow-Up Plans: PRN     Nutrition Goals  No nutrition goals identified at this time     Claire Mckinney, RD, LD, CCTD

## 2021-07-16 NOTE — LETTER
7/16/2021       RE: Lucero Herrera  [address] [address]     Dear Colleague,    Thank you for referring your patient, Lucero Herrera, to the [facility] [place] at [facility]. Please see a copy of my visit note below.    TRANSPLANT NEPHROLOGY DONOR EVALUATION    Assessment and Plan:  # Prospective Kidney Transplant Donor: Patient with a few issues that need to be addressed prior to donation. Patient's blood pressure is elevated at this visit, kidney function appears to be acceptable with Iohexol pending, and urinalysis is abnormal.    # High blood pressure measurement today: previous BP readings were normal 120/70's range.   - 24 hr BP monitoring.    # UA with 7 RBCs but negative urine dipstick for blood, no RBCs seen in UA in 3/2021   - repeat UA and microscopy in 2 weeks.    # History of breast CA:   -In remission since 10/2013. On anastrazole, ok to continue for total 10y.    # Age appropriate screening, mammogram uptodate negative, colonoscopy 5/2019, Tubular adenoma, recommended repeat in 5 years, PAP smear pt is not certain when she had it done.    - Obtain medical records.       Discussed the risks of donating a kidney, including the surgical risk and the possible risks of living with one kidney.    Education about expected post-donation kidney function and how chronic kidney disease (CKD) and end stage kidney disease (ESKD) might potentially impact the donor in the future, include, but not limited to:       - On average, donors will have 25-35% permanent loss of kidney function at donation.       - Baseline risk of ESKD may slightly exceed that of members of the general population with the same demographic profile.       - Donor risks must be interpreted in light of known epidemiology of both CKD or ESKD, such as that CKD generally develops in midlife (40-50 years old) and ESKD generally develops after age 60.        - Medical evaluation of young potential donors cannot predict lifetime risk of CKD or ESKD.       - Donors may be at higher risk for CKD if they sustain damage to the remaining kidney.       - Development of CKD and progression of ESKD may be more rapid with only 1 kidney.       - Some type of kidney replacement therapy, either kidney transplant or dialysis, is required when reaching ESKD.    Potential medical or surgical risks include, but not limited to:       - Death.       - Scars, pain, fatigue, and other consequences typical of any surgical procedure.       - Decreased kidney function.       - Abdominal or bowel symptoms, such as bloating and nausea, and developing bowel obstruction.       - Kidney failure (ESKD) and the need for a kidney transplant or dialysis for the donor.       - Impact of obesity, hypertension, or other donor-specific medical conditions on morbidity and mortality of the potential donor.    Patients overall evaluation will be discussed with the transplant team and a final recommendation on the patients' suitability to be a kidney transplant donor will be made at that time.    Physician Attestation   I, Manpreet Queen MD, personally examined and evaluated this patient.  I discussed the patient with the resident/fellow and care team, and agree with the assessment and plan of care as documented in the note on 07/16/21 .      I personally reviewed vital signs, medications, labs and imaging.    Key findings: 61-year-old female with a past medical history of intraductal carcinoma in 2013 status post left lumpectomy and sentinel lymph node biopsy 6/2000 positivity, HER-2 negative, grade 3 with negative nodes status post XRT which was completed in 10/2013 put on anastrozole x10 years, ovarian cyst status post cystectomy, presenting to be evaluated for her candidacy to be a living kidney donor to her sister who has CKD 5 secondary to type 1 diabetes.       Overall, the patient appears to be a  very good candidate to be a living donor.  She does have a urinalysis today that has 7 RBCs so that will need to be repeated in a few weeks.  She did have a prior UA that was negative in 3/2021.  She had a 24-hour urine in 4/2021 without any protein.  She had a negative mammogram 8/2020, colonoscopy 2019 with one adenoma with plan to repeat in 5 years.  She does state that she had a Pap smear in the last 3 years for which we will need to obtain the records for this.  I also recommend an EKG, stress echocardiogram (due to prior XRT to the chest) as well as a 24-hour ambulatory blood pressure monitoring given her elevated blood pressure here.  She did have prior normal blood pressures so I imagine that this is due to whitecoat hypertension. Her breast CA has been in remission since 10/2013. On anastrazole, ok to continue for total 10y.    Manpreet Queen MD  Date of Service (when I saw the patient): 07/16/21          Consult:  Lucero Herrera was seen in consultation at the request of Dr. Edison Butler for evaluation as a potential kidney transplant donor.    Reason for Visit:  Lucero Herrera is a 61 year old female who presents for a kidney donor evaluation.  Patient would like to donate to sister, not on dialysis, eGFR 16, diabetes is the cause of her CKD.    HPI:    Patient is 61 year old female with PMH of breast cancer in 2013, treated with lumbectomy and radiation, on anastrozole, in remission since 2013 who comes in today for kidney donor evaluation. BP reading was high today 158/88 mmHg, she doesn't remember being told her BP was elevated in the past. She doesn't routinely measure BP at home but states BP has been always ok when she went to doctor office over the last few years. UA today shows 7 RBCs but negative urine dipstick for blood, no RBCs seen in UA in 3/2021. She denies any history of hematuria or proteinuria. She had 24 hr urine protein which was normal.           Kidney Disease Hx:       h/o  Kidney Problems: No  Family h/o Genetic Kidney Disease: No       h/o HTN: No    Usual BP: Not checked       h/o Protein in Urine: No  h/o Blood in Urine: No       h/o Kidney Stones: No  h/o Kidney Injury: No       h/o Recurrent UTI: No  h/o Genitourinary Problems: No       h/o Chronic NSAID Use: No         Other Medical Hx:       h/o DM: No             h/o Gastrointestinal, Pancreas or Liver Problems: No       h/o Lung or Heart Problems: No       h/o Hematologic Problems: No  h/o Bleeding or Clotting Problems: No       h/o Cancer: Yes: Breast cancer, 2013, treated with lumbectomy and radiation. In remission since 2013.       h/o Infection Problems: No       h/o Gestational DM: No      h/o Preeclampsia: No         Skin Cancer Risk:       h/o more than 50 moles: No       h/o extensive sun exposure: No       h/o melanoma: No       Family h/o melanoma: No         Mental Health Assessment:       h/o Depression: No       h/o Psychiatric Illness: No       h/o Suicidal Attempt(s): No    Review Of Systems:   A comprehensive review of systems was obtained and negative, except as noted in the HPI or PMH.    Past Medical History:   History was taken from the patient as noted below.  Past Medical History:   Diagnosis Date     Arthritis      Breast cancer (H) 2013    Teatment at [place], [place]     CTS (carpal tunnel syndrome)      Heart murmur      Ovarian cyst     benign (per pt)       Past Social History:   Past Surgical History:   Procedure Laterality Date     LUMPECTOMY BREAST       OOPHORECTOMY       OVARIAN CYST REMOVAL       Personal or family history of anesthesia problems: No    Family History:   Family History   Problem Relation Age of Onset     Cancer Father      Diabetes Sister      Kidney Disease Sister           Specific Family History in First Degree Relatives:       FH of Kidney Dz: Yes sister  FH of DM: Yes  sister       FH of HTN: Yes multiple family members   FH of CAD: Yes , father       FH  of Cancer: No  FH of Kidney Cancer: No    Personal History:   Social History     Socioeconomic History     Marital status:      Spouse name: Not on file     Number of children: Not on file     Years of education: Not on file     Highest education level: Not on file   Occupational History     Not on file   Tobacco Use     Smoking status: Never Smoker     Smokeless tobacco: Never Used   Substance and Sexual Activity     Alcohol use: Yes     Comment: 2 drinks a week     Drug use: Never     Sexual activity: Not on file   Other Topics Concern     Not on file   Social History Narrative     Not on file     Social Determinants of Health     Financial Resource Strain:      Difficulty of Paying Living Expenses:    Food Insecurity:      Worried About Running Out of Food in the Last Year:      Ran Out of Food in the Last Year:    Transportation Needs:      Lack of Transportation (Medical):      Lack of Transportation (Non-Medical):    Physical Activity:      Days of Exercise per Week:      Minutes of Exercise per Session:    Stress:      Feeling of Stress :    Social Connections:      Frequency of Communication with Friends and Family:      Frequency of Social Gatherings with Friends and Family:      Attends [employer] Services:      Active Member of Clubs or Organizations:      Attends Club or Organization Meetings:      Marital Status:    Intimate Partner Violence:      Fear of Current or Ex-Partner:      Emotionally Abused:      Physically Abused:      Sexually Abused:           Specific Social History:       Health Insurance Status: Yes       Employment Status: Full time  Occupation: Self employed                       Living Arrangements: lives with their spouse       Social Support: Yes       Presence of increased risk for disease transmission behaviors as defined by PHS guidelines: No        Allergies:  No Known Allergies    Medications:  Current Outpatient Medications   Medication Sig     ANASTROZOLE PO Take by  "mouth daily     calcium-vitamin D-vitamin K (VIACTIV) 500-500-40 MG-UNT-MCG CHEW Take 1 tablet by mouth 2 times daily     cetirizine (ZYRTEC) 5 MG tablet Take 5 mg by mouth daily     fish oil-omega-3 fatty acids 1000 MG capsule Take 1 g by mouth 2 times daily     Glucosamine Sulfate 1500 MG PACK Take 1,500 mg by mouth 2 times daily     Pediatric Multivitamins-Fl (MULTIVITAMIN WITH 1 MG FLUORIDE) 1 MG CHEW Take 1 tablet by mouth daily     No current facility-administered medications for this visit.     Facility-Administered Medications Ordered in Other Visits   Medication     sodium chloride (PF) 0.9% PF flush 5 mL     There are no discontinued medications.      Vitals:  Vital Signs 7/16/2021 7/16/2021 7/16/2021   Systolic - 158 -   Diastolic - 88 -   Pulse - 55 47   Temperature - 97 -   Respirations - 16 -   Weight (LB) - 150 lb 11.2 oz 150 lb   Height - - 5' 2.205\"   BMI (Calculated) - - 27.25   Pain 0 - -   O2 - 96 100       Exam:   GENERAL APPEARANCE: alert and no distress  HENT: mouth without ulcers or lesions  LYMPHATICS: no cervical or supraclavicular nodes  RESP: lungs clear to auscultation - no rales, rhonchi or wheezes  CV: regular rhythm, normal rate, no rub, no murmur  EDEMA: no LE edema bilaterally  ABDOMEN: soft, nondistended, nontender, bowel sounds normal  MS: extremities normal - no gross deformities noted, no evidence of inflammation in joints, no muscle tenderness  SKIN: no rash    Results:   Labs and imaging were ordered for this visit and reviewed by me.  Recent Results (from the past 336 hour(s))   Creatinine    Collection Time: 07/16/21  7:29 AM   Result Value Ref Range    Creatinine 0.74 0.52 - 1.04 mg/dL    GFR Estimate 87 >60 mL/min/1.73m2   Hemoglobin    Collection Time: 07/16/21  7:29 AM   Result Value Ref Range    Hemoglobin 12.6 11.7 - 15.7 g/dL   Glucose    Collection Time: 07/16/21  7:29 AM   Result Value Ref Range    Glucose 92 70 - 99 mg/dL    Patient Fasting > 8hrs? Yes    UA with " Microscopic    Collection Time: 07/16/21  7:38 AM   Result Value Ref Range    Color Urine Yellow Colorless, Straw, Light Yellow, Yellow    Appearance Urine Slightly Cloudy (A) Clear    Glucose Urine Negative Negative mg/dL    Bilirubin Urine Negative Negative    Ketones Urine Negative Negative mg/dL    Specific Gravity Urine 1.014 1.003 - 1.035    Blood Urine Negative Negative    pH Urine 7.0 5.0 - 7.0    Protein Albumin Urine Negative Negative mg/dL    Urobilinogen Urine Normal Normal, 2.0 mg/dL    Nitrite Urine Negative Negative    Leukocyte Esterase Urine Negative Negative    RBC Urine 7 (H) <=2 /HPF    WBC Urine <1 <=5 /HPF               Again, thank you for allowing me to participate in the care of your patient.      Sincerely,     Kidney Donor Young

## 2021-07-16 NOTE — LETTER
REIMBURSEMENT INFORMATION FOR LIVING ORGAN DONORS    LIVING ORGAN DONOR: This form MUST accompany & remain attached to Orders &  given to Provider and/or Healthcare Facility Business Office    PROVIDER/FACILITY INSTRUCTIONS: By accepting to perform these services for living organ  donation, the provider/facility agrees to exclusively bill the [facility] instead of billing  the patient or any insurance provider and agrees to accept the reimbursement, as described below, as  payment in full for services rendered.    PROVIDER BILLING INSTRUCTIONS:  1. [facility] agrees to pay for all authorized testing ordered by our transplant  program that is related to living organ donation. The attached orders/tests are part of the donor  Evaluation.    2. Do not bill the donor or donor's insurance. Send an itemized invoice, claim or statement to:    [facility]  Transplant Finance/Donor Billing  [address] [place] [address]    3. Billing statements must include the patient first and last name, date of birth, the CPT procedure code  and date of service. Please bill service on the ORIGINAL UBO4 or 1500 with appropriate CPT/HCPCS  codes along with W-9 and send to the above address to insure timely reimbursement.    4. Claims should be submitted no later than six months from the date when services are rendered.  Claims denied for late submission should not be billed to the donor or their private insurance carrier.    5. [facility] will reimburse all charges at 100% of the Medicare Fee Schedule as  defined in the Code of Federal Regulations (CFR) 42, Chapter IV. This is to be considered payment  in full. [facility], the patient, and/or the patient's insurance are NOT to  be billed any balance, co-payment, or deductible, per Medicare regulations. **ATTN: Facility  providing services for attached/enclosed Living Donor Orders; If facility does  NOT AGREE to  the reimbursement rate stated above, PLEASE DENY SERVICES & refer Donor/patient back to  their [facility].    6. Patients are NOT to make any payments at the time of service.    Please forward this information to your billing department so that a donor account can be set up with  these instructions.    Should you have any questions, please contact the Donor Billing office at [phone],  Monday - Friday, 8:00 a.m. to 4:00 p.m.   Thank you for your assistance.

## 2021-07-16 NOTE — PROGRESS NOTES
"Psychosocial Evaluation  Living Organ Donation per OPTN Policy 14.1.A  Organ Type: Related Kidney Donor  Presenting Information:  Lucero \"Nahomi\" Javier presents to the [facility] to complete a psychosocial evaluation since she is interested in becoming a kidney donor for her sister, Marybeth Martin.  Nahomi is a 61 year old  female.  She was neatly dressed and groomed.  Her affect was bright and thought process logical and goal directed.  Her sister was with her at the clinic but I met with her alone.  She was pleasant and forthcoming in sharing information.  PERSONAL BACKGROUND:  Current Living Situation: Nahomi lives in a single family home in [place], [place].  She lives with her .     Education/Employment/Financial Situation: Nahomi earned a [employer]'s degree in nursing.  However, for the past 25 years, she has been  self employed with [employer].  Her  is an  and will be retiring in six days.    Health Insurance Status: she has health insurance.    Family History: Nahomi was reared in [place].  Her mother is living.  Her father  suddenly in his sleep, at age 82.  They think he had a heart attack.  She has one sister who is in need of a transplant and one younger brother who is healthy live living in [place].    Nahomi and her  Jeremi have been  for 33 years.  They have two adult children, ages 32 and 28 who are doing well and living independently.    General Health: She considers herself to be generally healthy.    Mental Health: She denies any current or past mental health issues.  She has never had reason to seek out the support of a therapist or psychiatrist.  No suicidal ideation or attempts.  No family history of mental illness.    Alcohol and Drug Use/Abuse/Dependency: She drinks two or three glasses of wine a week.  No drug use.  No current or past chemical health issues. No family history of substance abuse " disorders.    Cigarette Use: Denies    Legal: Denies    Coping with major surgery/associated stress:  She anticipate she will do well with surgery and recovery.  Her  will be here with her and is a great source of support.  She anticipates she will stay here, with her sister, for two weeks after surgery, which will not be a problem for her or her .    Support System: She reports a robust network of support, including family , friends and her [employer] community.    DONOR SPECIFIC INFORMATION:  Relationship to Recipient: The recipient is her sister, Marybeth Martin.  She used to live in [place], [place] and moved to [place] 2 1/2 years go.  They are very different yet have a very good relationship.  She has had type 1 diabetes since she was a teenager.    Decision Process/Motivation to Donate: She denies pressure, coercion or inducement.  This has been something that she has been interested in doing for a while.  Her  is supportive and will come to [place] to help care for her at the time of surgery.      PREPARATION FOR DONATION, RECOVERY, AND POTENTIAL SHORT-LONG-TERM OUTCOMES:  Understanding of the Living Donation Process:   We discussed the role of the donor  and Independent Donor Advocate.  Short and long term medical and psychosocial risks to both, donor and recipient were reviewed and she is capable of understanding the risks.  High risk behaviors as defined by [place] Public Health Services (PHS) 2013 that have potential to increase risk of disease transmission were reviewed and she denies increased risk behaviors. Post surgical restrictions (2 weeks no driving, 6 weeks no lifting over 10 lbs) were reviewed and she appears capable of adhering to the post surgical requirements. The need for a caregiver was discussed and her  will be here to care for her .  The risk of poor psychosocial outcome including problems with body image, post-surgery depression or anxiety, or feelings of  emotional distress or bereavement if recipient experiences any recurrent disease, poor outcome or death was reviewed.  Additionally, potential financial implications, including the risk of having difficulty obtaining health care insurance, life insurance, disability insurance, or long term care insurance were reviewed, as were available donor grants to assist with donor related expenses.  She will benefit from travel reimbursement    We also discussed some unique issues that arise with paired kidney donation, which include the uncertainty of the timing and the importance of having a employment situation and support system that is able to provide sustained support and flexibility.    She appears capable of understanding this information and making an informed medical decision.    Impressions/Recommendations:   Lucero Herrera  is highly motivated to donate a kidney  to Marybeth Martin.  Her decision to donate is free of inducement, coercion, or other undue pressure.   Her housing, finances and employment are stable.  No current/active mental health or chemical abuse issues were identified.  The need for a caregiver was reviewed and She is able to identify a plan to meet her post operative care needs.  She appears capable of making an informed medical decision.  No psychosocial contraindications to living organ donation were identified and  I support Lucero Herrera s desire to donate a kidney to Marybeth Martin.         Contact Information:   JOHN ADAMSON, [facility]  Clinical  and Independent Donor Advocate  [place]  Phone - [phone]  Pager - [phone]  [email or web address]      Time Spent: 60 minutes      Living Kidney Donor Consent per OPTN Policy 14.3.A for Independent Living Donor Advocate (JOSEPH)    Written assurance has been obtained from the potential donor that he/she:   Is willing to donate  Is free from inducement and coercion  Has been informed that the he/she may decline to donate at  any time  Has been informed that transplant centers must:   A) Offer donors an opportunity to discontinue the donor consent or evaluation process in a way that is protected and confidential  B) Provide an independent living donor advocate (JOSEPH) to assist the potential donor during this process    The following was presented to the potential donor in a language in which the potential donor is able to engage in meaningful dialogue:   Education and instruction about all phases of the living donation process including:   Consent  Medical and psychosocial evaluation  Information about the surgical procedure  Pre and post operative care  Benefits of post operative follow up  Disclosure that the [facility] will take all reasonable precautions to provide confidentiality for the donor/recipient  Disclosure that it is a federal crime for any person to knowingly acquire, obtain or otherwise transfer any human organ for valuable consideration  Disclosure that the [facility] must provide an independent living donor advocate (JOSEPH)  Disclosure that health information obtained during the evaluation is subject to the same regulations as all records and could reveal conditions that must be reported to local, state, or federal public health authorities  Disclosure that the [facility] is required to report living donor follow up information at 6 months, 1 year, and 2 years, and that the potential donor must commit to post operative follow up testing coordinated by the [facility]    Disclosure has been provided that these risks may be transient or permanent & include but are not limited to:  Potential psychosocial risks:  Problems with body image  Post-surgery depression or anxiety  Feelings of emotional distress or bereavement if recipient experiences any recurrent disease or in the event of the recipient s death  Impact of donation on the donor s lifestyle    Potential financial impacts:  Personal  expenses of travel, housing, , lost wages related to donation might not be reimbursed. However, resources may be available to defray some donation-related expenses   Need for life-long follow up at the donor s expense  Loss of employment or income  Negative impact on the ability to obtain future employment  Negative impact on the ability to obtain, maintain, or afford health, disability, and life insurance  Future health problems experienced by living donors following donation may not be covered by the recipient s insurance    Contact Information:  JOHN ADAMSON, [facility]  Clinical  and Independent Donor Advocate  [facility]  Phone - [phone]  Pager - [phone]  [email or web address]      Time Spent: 60 minutes

## 2021-07-16 NOTE — LETTER
REIMBURSEMENT INFORMATION FOR LIVING ORGAN DONORS    LIVING ORGAN DONOR: This form MUST accompany & remain attached to Orders &  given to Provider and/or Healthcare Facility Business Office    PROVIDER/FACILITY INSTRUCTIONS: By accepting to perform these services for living organ  donation, the provider/facility agrees to exclusively bill the [facility] instead of billing  the patient or any insurance provider and agrees to accept the reimbursement, as described below, as  payment in full for services rendered.    PROVIDER BILLING INSTRUCTIONS:  1. [facility] agrees to pay for all authorized testing ordered by our transplant  program that is related to living organ donation. The attached orders/tests are part of the donor  Evaluation.    2. Do not bill the donor or donor's insurance. Send an itemized invoice, claim or statement to:    [facility]  Transplant Finance/Donor Billing  [address] [place]  [place] [address]    3. Billing statements must include the patient first and last name, date of birth, the CPT procedure code  and date of service. Please bill service on the ORIGINAL UBO4 or 1500 with appropriate CPT/HCPCS  codes along with W-9 and send to the above address to insure timely reimbursement.    4. Claims should be submitted no later than six months from the date when services are rendered.  Claims denied for late submission should not be billed to the donor or their private insurance carrier.    5. [facility] will reimburse all charges at 100% of the Medicare Fee Schedule as  defined in the Code of Federal Regulations (CFR) 42, Chapter IV. This is to be considered payment  in full. [facility], the patient, and/or the patient's insurance are NOT to  be billed any balance, co-payment, or deductible, per Medicare regulations. **ATTN: Facility  providing services for attached/enclosed Living Donor Orders; If facility does  NOT AGREE to  the reimbursement rate stated above, PLEASE DENY SERVICES & refer Donor/patient back to  their [facility] Transplant [facility].    6. Patients are NOT to make any payments at the time of service.    Please forward this information to your billing department so that a donor account can be set up with  these instructions.    Should you have any questions, please contact the Donor Billing office at [phone],  Monday - Friday, 8:00 a.m. to 4:00 p.m.   Thank you for your assistance.

## 2021-07-16 NOTE — LETTER
PHYSICIAN ORDERS      DATE & TIME ISSUED: 2021 11:37 AM  PATIENT NAME: Lucero Herrera   : 1960     [facility] MR# [if applicable]: [id number]     DIAGNOSIS:  Potential Living Donor  ICD-10 CODE: Z00.5     Please do the following test:       1)Exercise Stress Echo    Any questions please call: Fawn at [phone]    Please fax results to:[phone] ATTN:Fawn Saldaña MD FACS  Assistant Professor of Surgery  Director, Living Kidney Donor Program.

## 2021-07-16 NOTE — LETTER
PHYSICIAN ORDERS      DATE & TIME ISSUED: 2021 11:34 AM  PATIENT NAME: Lucero Herrera   : 1960     [facility] MR# [if applicable]: [id number]     DIAGNOSIS:  Potential Living Donor  ICD-10 CODE: Z00.5     Please do the following test:       1)EKG-12 lead complete with read    Any questions please call: Fawn at [phone]    Please fax results to:[phone] ATTN:Fawn Saldaña MD FACS  Assistant Professor of Surgery  Director, Living Kidney Donor Program.

## 2021-07-16 NOTE — PROGRESS NOTES
TRANSPLANT NEPHROLOGY DONOR EVALUATION    Assessment and Plan:  # Prospective Kidney Transplant Donor: Patient with a few issues that need to be addressed prior to donation. Patient's blood pressure is elevated at this visit, kidney function appears to be acceptable with Iohexol pending, and urinalysis is abnormal.    # High blood pressure measurement today: previous BP readings were normal 120/70's range.   - 24 hr BP monitoring.    # UA with 7 RBCs but negative urine dipstick for blood, no RBCs seen in UA in 3/2021   - repeat UA and microscopy in 2 weeks.    # History of breast CA:   -In remission since 10/2013. On anastrazole, ok to continue for total 10y.    # Age appropriate screening, mammogram uptodate negative, colonoscopy 5/2019, Tubular adenoma, recommended repeat in 5 years, PAP smear pt is not certain when she had it done.    - Obtain medical records.       Discussed the risks of donating a kidney, including the surgical risk and the possible risks of living with one kidney.    Education about expected post-donation kidney function and how chronic kidney disease (CKD) and end stage kidney disease (ESKD) might potentially impact the donor in the future, include, but not limited to:       - On average, donors will have 25-35% permanent loss of kidney function at donation.       - Baseline risk of ESKD may slightly exceed that of members of the general population with the same demographic profile.       - Donor risks must be interpreted in light of known epidemiology of both CKD or ESKD, such as that CKD generally develops in midlife (40-50 years old) and ESKD generally develops after age 60.       - Medical evaluation of young potential donors cannot predict lifetime risk of CKD or ESKD.       - Donors may be at higher risk for CKD if they sustain damage to the remaining kidney.       - Development of CKD and progression of ESKD may be more rapid with only 1 kidney.       - Some type of kidney replacement  therapy, either kidney transplant or dialysis, is required when reaching ESKD.    Potential medical or surgical risks include, but not limited to:       - Death.       - Scars, pain, fatigue, and other consequences typical of any surgical procedure.       - Decreased kidney function.       - Abdominal or bowel symptoms, such as bloating and nausea, and developing bowel obstruction.       - Kidney failure (ESKD) and the need for a kidney transplant or dialysis for the donor.       - Impact of obesity, hypertension, or other donor-specific medical conditions on morbidity and mortality of the potential donor.    Patients overall evaluation will be discussed with the transplant team and a final recommendation on the patients' suitability to be a kidney transplant donor will be made at that time.    Physician Attestation   I, Manpreet Queen MD, personally examined and evaluated this patient.  I discussed the patient with the resident/fellow and care team, and agree with the assessment and plan of care as documented in the note on 07/16/21 .      I personally reviewed vital signs, medications, labs and imaging.    Key findings: 61-year-old female with a past medical history of intraductal carcinoma in 2013 status post left lumpectomy and sentinel lymph node biopsy 6/2000 positivity, HER-2 negative, grade 3 with negative nodes status post XRT which was completed in 10/2013 put on anastrozole x10 years, ovarian cyst status post cystectomy, presenting to be evaluated for her candidacy to be a living kidney donor to her sister who has CKD 5 secondary to type 1 diabetes.       Overall, the patient appears to be a very good candidate to be a living donor.  She does have a urinalysis today that has 7 RBCs so that will need to be repeated in a few weeks.  She did have a prior UA that was negative in 3/2021.  She had a 24-hour urine in 4/2021 without any protein.  She had a negative mammogram 8/2020, colonoscopy 2019 with one  adenoma with plan to repeat in 5 years.  She does state that she had a Pap smear in the last 3 years for which we will need to obtain the records for this.  I also recommend an EKG, stress echocardiogram (due to prior XRT to the chest) as well as a 24-hour ambulatory blood pressure monitoring given her elevated blood pressure here.  She did have prior normal blood pressures so I imagine that this is due to whitecoat hypertension. Her breast CA has been in remission since 10/2013. On anastrazole, ok to continue for total 10y.    Mnapreet Queen MD  Date of Service (when I saw the patient): 07/16/21          Consult:  Lucero Herrera was seen in consultation at the request of Dr. Edison Butler for evaluation as a potential kidney transplant donor.    Reason for Visit:  Lucero Herrera is a 61 year old female who presents for a kidney donor evaluation.  Patient would like to donate to sister, not on dialysis, eGFR 16, diabetes is the cause of her CKD.    HPI:    Patient is 61 year old female with PMH of breast cancer in 2013, treated with lumbectomy and radiation, on anastrozole, in remission since 2013 who comes in today for kidney donor evaluation. BP reading was high today 158/88 mmHg, she doesn't remember being told her BP was elevated in the past. She doesn't routinely measure BP at home but states BP has been always ok when she went to doctor office over the last few years. UA today shows 7 RBCs but negative urine dipstick for blood, no RBCs seen in UA in 3/2021. She denies any history of hematuria or proteinuria. She had 24 hr urine protein which was normal.           Kidney Disease Hx:       h/o Kidney Problems: No  Family h/o Genetic Kidney Disease: No       h/o HTN: No    Usual BP: Not checked       h/o Protein in Urine: No  h/o Blood in Urine: No       h/o Kidney Stones: No  h/o Kidney Injury: No       h/o Recurrent UTI: No  h/o Genitourinary Problems: No       h/o Chronic NSAID Use: No         Other  Medical Hx:       h/o DM: No             h/o Gastrointestinal, Pancreas or Liver Problems: No       h/o Lung or Heart Problems: No       h/o Hematologic Problems: No  h/o Bleeding or Clotting Problems: No       h/o Cancer: Yes: Breast cancer, 2013, treated with lumbectomy and radiation. In remission since 2013.       h/o Infection Problems: No       h/o Gestational DM: No      h/o Preeclampsia: No         Skin Cancer Risk:       h/o more than 50 moles: No       h/o extensive sun exposure: No       h/o melanoma: No       Family h/o melanoma: No         Mental Health Assessment:       h/o Depression: No       h/o Psychiatric Illness: No       h/o Suicidal Attempt(s): No    Review Of Systems:   A comprehensive review of systems was obtained and negative, except as noted in the HPI or PMH.    Past Medical History:   History was taken from the patient as noted below.  Past Medical History:   Diagnosis Date     Arthritis      Breast cancer (H) 2013    Teatment at [place], [place]     CTS (carpal tunnel syndrome)      Heart murmur      Ovarian cyst     benign (per pt)       Past Social History:   Past Surgical History:   Procedure Laterality Date     LUMPECTOMY BREAST       OOPHORECTOMY       OVARIAN CYST REMOVAL       Personal or family history of anesthesia problems: No    Family History:   Family History   Problem Relation Age of Onset     Cancer Father      Diabetes Sister      Kidney Disease Sister           Specific Family History in First Degree Relatives:       FH of Kidney Dz: Yes sister  FH of DM: Yes  sister       FH of HTN: Yes multiple family members   FH of CAD: Yes , father       FH of Cancer: No  FH of Kidney Cancer: No    Personal History:   Social History     Socioeconomic History     Marital status:      Spouse name: Not on file     Number of children: Not on file     Years of education: Not on file     Highest education level: Not on file   Occupational History     Not on file    Tobacco Use     Smoking status: Never Smoker     Smokeless tobacco: Never Used   Substance and Sexual Activity     Alcohol use: Yes     Comment: 2 drinks a week     Drug use: Never     Sexual activity: Not on file   Other Topics Concern     Not on file   Social History Narrative     Not on file     Social Determinants of Health     Financial Resource Strain:      Difficulty of Paying Living Expenses:    Food Insecurity:      Worried About Running Out of Food in the Last Year:      Ran Out of Food in the Last Year:    Transportation Needs:      Lack of Transportation (Medical):      Lack of Transportation (Non-Medical):    Physical Activity:      Days of Exercise per Week:      Minutes of Exercise per Session:    Stress:      Feeling of Stress :    Social Connections:      Frequency of Communication with Friends and Family:      Frequency of Social Gatherings with Friends and Family:      Attends Zoroastrianism Services:      Active Member of Clubs or Organizations:      Attends Club or Organization Meetings:      Marital Status:    Intimate Partner Violence:      Fear of Current or Ex-Partner:      Emotionally Abused:      Physically Abused:      Sexually Abused:           Specific Social History:       Health Insurance Status: Yes       Employment Status: Full time  Occupation: Self employed                       Living Arrangements: lives with their spouse       Social Support: Yes       Presence of increased risk for disease transmission behaviors as defined by PHS guidelines: No        Allergies:  No Known Allergies    Medications:  Current Outpatient Medications   Medication Sig     ANASTROZOLE PO Take by mouth daily     calcium-vitamin D-vitamin K (VIACTIV) 500-500-40 MG-UNT-MCG CHEW Take 1 tablet by mouth 2 times daily     cetirizine (ZYRTEC) 5 MG tablet Take 5 mg by mouth daily     fish oil-omega-3 fatty acids 1000 MG capsule Take 1 g by mouth 2 times daily     Glucosamine Sulfate 1500 MG PACK Take 1,500 mg by  "mouth 2 times daily     Pediatric Multivitamins-Fl (MULTIVITAMIN WITH 1 MG FLUORIDE) 1 MG CHEW Take 1 tablet by mouth daily     No current facility-administered medications for this visit.     Facility-Administered Medications Ordered in Other Visits   Medication     sodium chloride (PF) 0.9% PF flush 5 mL     There are no discontinued medications.      Vitals:  Vital Signs 7/16/2021 7/16/2021 7/16/2021   Systolic - 158 -   Diastolic - 88 -   Pulse - 55 47   Temperature - 97 -   Respirations - 16 -   Weight (LB) - 150 lb 11.2 oz 150 lb   Height - - 5' 2.205\"   BMI (Calculated) - - 27.25   Pain 0 - -   O2 - 96 100       Exam:   GENERAL APPEARANCE: alert and no distress  HENT: mouth without ulcers or lesions  LYMPHATICS: no cervical or supraclavicular nodes  RESP: lungs clear to auscultation - no rales, rhonchi or wheezes  CV: regular rhythm, normal rate, no rub, no murmur  EDEMA: no LE edema bilaterally  ABDOMEN: soft, nondistended, nontender, bowel sounds normal  MS: extremities normal - no gross deformities noted, no evidence of inflammation in joints, no muscle tenderness  SKIN: no rash    Results:   Labs and imaging were ordered for this visit and reviewed by me.  Recent Results (from the past 336 hour(s))   Creatinine    Collection Time: 07/16/21  7:29 AM   Result Value Ref Range    Creatinine 0.74 0.52 - 1.04 mg/dL    GFR Estimate 87 >60 mL/min/1.73m2   Hemoglobin    Collection Time: 07/16/21  7:29 AM   Result Value Ref Range    Hemoglobin 12.6 11.7 - 15.7 g/dL   Glucose    Collection Time: 07/16/21  7:29 AM   Result Value Ref Range    Glucose 92 70 - 99 mg/dL    Patient Fasting > 8hrs? Yes    UA with Microscopic    Collection Time: 07/16/21  7:38 AM   Result Value Ref Range    Color Urine Yellow Colorless, Straw, Light Yellow, Yellow    Appearance Urine Slightly Cloudy (A) Clear    Glucose Urine Negative Negative mg/dL    Bilirubin Urine Negative Negative    Ketones Urine Negative Negative mg/dL    Specific " Gravity Urine 1.014 1.003 - 1.035    Blood Urine Negative Negative    pH Urine 7.0 5.0 - 7.0    Protein Albumin Urine Negative Negative mg/dL    Urobilinogen Urine Normal Normal, 2.0 mg/dL    Nitrite Urine Negative Negative    Leukocyte Esterase Urine Negative Negative    RBC Urine 7 (H) <=2 /HPF    WBC Urine <1 <=5 /HPF

## 2021-07-16 NOTE — LETTER
2021         RE: Lucero Herrera  [address] [facility] [address]        Dear Colleague,    Thank you for referring your patient, Lucero Herrera, to the [facility]. Please see a copy of my visit note below.    Donor Iohexol test    Lucero Herrera presents today to [facility] for a Donor Iohexol test.      Progress note:  ID verified by name and .     The following information was verified with the patient:  Female Patients is there any possibility of being pregnant No  Is there a history of allergy (skin rash, swelling, ect) to:   A.  Iodine (except skin reactions to betadine): No   B. Intravenous radio-contrast agents: No   C. Seafood No     present during visit today: Not Applicable.    R.N. provided patient with educational handout regarding timed test. Yes    butterfly placed in right AC and Iohexol administered over 2 minutes.  Positive blood return verified before and after injection. Butterfly needle removed.  20 gauge PIV placed in 2nd floor lab in left ac  for blood draws and CT this afternoon.    Medication administered:  Iohexol (Omnipaque 300mg iodine/ml concentration) 5 mls.    Start time: 819  Stop time: 821    Drug Waste Record    Drug Name: iohexol  Dose: 5ml  Route administered: IV  NDC #: 0407--1413-10  Amount of waste(mL):5ml  Reason for waste: Single use vial    Administrations This Visit     iohexol (OMNIPAQUE 300) injection 5 mL     Admin Date  2021 Action  Given Dose  5 mL Route  Intravenous Administered By  Desirae Martinez RN                    Evaluation nurse in transplant to draw labs at 2 and 4 hours post iohexol administration.  Patient given a slip with the times to get labs drawn and verbalized understanding of the plan.    Patient tolerated the procedure:  Yes    After the infusion patient was discharged to the next appointment.    Desirae Martinez RN            Again, thank you for allowing me to  participate in the care of your patient.        Sincerely,        [facility]

## 2021-07-16 NOTE — LETTER
PHYSICIAN ORDERS      DATE & TIME ISSUED: 2021 11:30 AM  PATIENT NAME: Lucero Herrera   : 1960     [facility] MR# [if applicable]: [id number]     DIAGNOSIS:  Potential Living Donor  ICD-10 CODE: Z00.5     Please do the following lab tests:       1)CBC with diff/plts       2)Urinalysis(UA)with Microscopic--1st void    Any questions please call: Fawn at [phone]    Please fax results to:[phone] ATTN:Fawn Saldaña MD FACS  Assistant Professor of Surgery  Director, Living Kidney Donor Program.

## 2021-07-17 LAB
ALBUMIN UR-MCNC: NEGATIVE MG/DL
APPEARANCE UR: CLEAR
BILIRUB UR QL STRIP: NEGATIVE
COLOR UR AUTO: NORMAL
GLUCOSE UR STRIP-MCNC: NEGATIVE MG/DL
HGB UR QL STRIP: NEGATIVE
KETONES UR STRIP-MCNC: NEGATIVE MG/DL
LEUKOCYTE ESTERASE UR QL STRIP: NEGATIVE
NITRATE UR QL: NEGATIVE
PH UR STRIP: 7 [PH] (ref 5–7)
RBC URINE: 2 /HPF
SP GR UR STRIP: 1 (ref 1–1.03)
T PALLIDUM AB SER QL: NONREACTIVE
UROBILINOGEN UR STRIP-MCNC: NORMAL MG/DL
WBC URINE: 1 /HPF

## 2021-07-18 LAB
QUANTIFERON MITOGEN: 10 IU/ML
QUANTIFERON NIL TUBE: 0.05 IU/ML
QUANTIFERON TB1 TUBE: 0.06 IU/ML
QUANTIFERON TB2 TUBE: 0.06

## 2021-07-19 LAB
CMV IGG SERPL IA-ACNC: <0.2 U/ML
CMV IGG SERPL IA-ACNC: NORMAL
EBV VCA IGG SER IA-ACNC: 332 U/ML
EBV VCA IGG SER IA-ACNC: POSITIVE
EBV VCA IGM SER IA-ACNC: <10 U/ML
EBV VCA IGM SER IA-ACNC: NORMAL
GAMMA INTERFERON BACKGROUND BLD IA-ACNC: 0.05 IU/ML
HBV CORE AB SERPL QL IA: NONREACTIVE
HBV SURFACE AB SERPL IA-ACNC: 0 M[IU]/ML
HBV SURFACE AG SERPL QL IA: NONREACTIVE
HCV AB SERPL QL IA: NONREACTIVE
HIV 1+2 AB+HIV1 P24 AG SERPL QL IA: NONREACTIVE
M TB IFN-G BLD-IMP: NEGATIVE
M TB IFN-G CD4+ BCKGRND COR BLD-ACNC: 9.95 IU/ML
MITOGEN IGNF BCKGRD COR BLD-ACNC: 0.01 IU/ML
MITOGEN IGNF BCKGRD COR BLD-ACNC: 0.01 IU/ML

## 2021-07-21 ENCOUNTER — COMMITTEE REVIEW (OUTPATIENT)
Dept: TRANSPLANT | Facility: CLINIC | Age: 61
End: 2021-07-21

## 2021-07-21 NOTE — COMMITTEE REVIEW
Living Donor Committee Review Note Evaluation Date: 6/29/2021  Committee Review Date: 7/21/2021    Donor being evaluated for: Kidney    Transplant Phase: Evaluation  Transplant Status: Active    Transplant Coordinator: Oriana Taylor  Transplant Surgeon:       Committee Review Members:  Nephrology Manpreet Queen MD, Gurwinder Sawyer MD   Nutrition Claire Mckinney, RD   Pharmacy Honey Longoria, [facility]    - Clinical Karly Calvillo, [facility]   Transplant Pamela Jess Rizo, RN, Ian Faust MD, Rain Arvizu, NP, Terra Kelley, ANJANA, Fawn Bain, LPN, Madonna Weber, ANJANA, Oriana Taylor RN       Transplant Eligibility: Acceptable Mental Health    Committee Review Decision: Needs Re-presentation    Relative Contraindications:     Absolute Contraindications:     Committee Chair Ian Faust MD verbally attested to the committee's decision.    Committee Discussion Details:     Reviewed Lucero's abnormal evaluation findings:    -elevated BP- needs 24hour BP  -CBC- needs repeat with diff  -UA- needs repeat (first void of day)  -EKG- needs repeat    Also of note:     -echocardiogram reviewed- needs exercise stress test  -iohexol results unable to be completed- creatinine clearance done 4/28/21 reviewed- okay, no further GFR testing needed, no need to reschedule iohexol    CT previously reviewed:  July 20, 2021 4:59 PM -  [id number]:   Patient's abdominal CT reviewed on 7/20/21 with the following surgeon(s) present: Dr. Hamm  Suggested side for kidney for donation is LEFT.   Incidental findings reviewed: liver hypodensities- okay, no further follow up needed    Next Steps:    Call patient to discuss results/reccomendations     If patient interested in continuing in donor eval, send appropriate orders/billing info    Nahomi called and updated of above. She confirmed desire to proceed. Plan for 24 hour BP and remaining items pending BP results. Order placed with [facility] for BP.

## 2021-07-24 ENCOUNTER — TRANSFERRED RECORDS (OUTPATIENT)
Dept: HEALTH INFORMATION MANAGEMENT | Facility: CLINIC | Age: 61
End: 2021-07-24

## 2021-07-30 ENCOUNTER — TELEPHONE (OUTPATIENT)
Dept: TRANSPLANT | Facility: CLINIC | Age: 61
End: 2021-07-30

## 2021-07-30 ENCOUNTER — DOCUMENTATION ONLY (OUTPATIENT)
Dept: TRANSPLANT | Facility: CLINIC | Age: 61
End: 2021-07-30

## 2021-07-30 DIAGNOSIS — Z00.5 TRANSPLANT DONOR EVALUATION: Primary | ICD-10-CM

## 2021-07-30 NOTE — TELEPHONE ENCOUNTER
Informed Johana that her 24hr BP's were good. Discuss her now needing the repeat EKG,Exercise Stress Echo,1st void UA,CBCwith plts/diff. Is sched for Mammo 9/3. Last colonoscopy in 2019 when polyps removed is in [facility] and Dr's told her next one should be in 5yrs.Sent to Oriana ANDERS for Pap results done in 2019--'s say do q 3yrs. Will now send her all orders for these tests.

## 2021-07-30 NOTE — PROGRESS NOTES
Admin had sent CHAGO requesting Pap results done in 2019 on 7/21 and no results.She will resend request now again.

## 2021-07-30 NOTE — PROGRESS NOTES
Received message from Johana below:    I wanted to clarify where you would have received my Pap results - it should have been faxed to Oriana Taylor at [phone].  It would have come from [facility] where my doctor Dr. Pooja Boston used to practice.    I hope this helps - let me know if you need more help!  I have scheduled my EKG and Stress Test for Monday, Aug 9th. GUERRERO Comer

## 2021-08-02 ENCOUNTER — DOCUMENTATION ONLY (OUTPATIENT)
Dept: TRANSPLANT | Facility: CLINIC | Age: 61
End: 2021-08-02

## 2021-08-02 NOTE — PROGRESS NOTES
[facility]  Consult Note     Medical record number: [id number]  YOB: 1960,     Date of Visit:  07/16/2021  Consult requested by the patient for evaluation of kidney donation candidacy.    Assessment and Recommendations: Ms. Herrera appears to be a good candidate for kidney donation at this point in the evaluation. The following issues will need to be addressed prior to formal review:  Iohexal GFR  CT angio  Obtain medical records       The risks of the surgical procedure including but not limited to the rare risk of mortality discussed in detail. Patient verbalized good understanding and had several pertinent questions which were answered.      The majority of our visit today was spent in counseling regarding the medical and surgical risks of kidney donation; the typical lorrie-and post-operative experience and recovery/return to work pattern; restrictions related to the surgery (driving; lifting; exercise).      We also talked about post-op visits and longer term health care maintenance, as well as the implications of having one remaining kidney. This discussion included, but was not limited to rates of complications such as bleeding, infection, need for transfusion, reoperation, other organ injury, future bowel obstruction, incisional hernia, port site pain, venous thrombosis, pulmonary embolism, renal failure, and death (3 per 10,000).     We discussed long-term risks in detail.  I discussed the articles suggesting a small increase in ESKD in donors and their limitations    We discussed recovery, including length of hospital stay; no new meds other than pain meds on discharge; limitations after surgery (no driving for a couple of weeks; no lifting over 10 lbs or exercise stretching abdominal muscles for 6 weeks; and fatigue for the first few weeks postdonation.  I informed her of our donor survey results on donors feeling ready to drive, and on return to feeling back to  normal.  We also discussed the need for maintaining a healthy lifestyle long-term after donation  We discussed the national paired system and the possibility of participating, if not a match for the intended recipient.  I explained how the system worked.    We discussed the increased risk for venous thrombosis for the 1st two postoperative risks.  We discussed that if the family were to drive home in the 1st 2 weeks, the  should stop approximately every 40 minutes and she should get out of the car and walk around for a couple of minutes.    She had numerous questions about the details of the surgical procedure which we discussed in detail.   At the conclusion of the visit, all questions had been answered and Ms. Herrera's candidacy for donation will be reviewed at our Multidisciplinary Donor Selection Committee.     Total time: 45 minutes  Counselling time: 35 minutes        Edison Butler MD  Surgical Director, Kidney Transplantation                                                                                                        ---------------------------------------------------------------------------------------------------    HPI: Ms. Herrera wishes to donate a kidney to sibling.  PMH: breast cancer 2013       Past Medical History:   Diagnosis Date     Arthritis      Breast cancer (H) 2013    Teatment at [place], [place]     CTS (carpal tunnel syndrome)      Heart murmur      Ovarian cyst     benign (per pt)     Past Surgical History:   Procedure Laterality Date     LUMPECTOMY BREAST       OOPHORECTOMY       OVARIAN CYST REMOVAL       Family History   Problem Relation Age of Onset     Cancer Father      Diabetes Sister      Kidney Disease Sister      Social History     Socioeconomic History     Marital status:      Spouse name: Not on file     Number of children: Not on file     Years of education: Not on file     Highest education level: Not on file   Occupational History      Not on file   Tobacco Use     Smoking status: Never Smoker     Smokeless tobacco: Never Used   Substance and Sexual Activity     Alcohol use: Yes     Comment: 2 drinks a week     Drug use: Never     Sexual activity: Not on file   Other Topics Concern     Not on file   Social History Narrative     Not on file     Social Determinants of Health     Financial Resource Strain:      Difficulty of Paying Living Expenses:    Food Insecurity:      Worried About Running Out of Food in the Last Year:      Ran Out of Food in the Last Year:    Transportation Needs:      Lack of Transportation (Medical):      Lack of Transportation (Non-Medical):    Physical Activity:      Days of Exercise per Week:      Minutes of Exercise per Session:    Stress:      Feeling of Stress :    Social Connections:      Frequency of Communication with Friends and Family:      Frequency of Social Gatherings with Friends and Family:      Attends Nondenominational Services:      Active Member of Clubs or Organizations:      Attends Club or Organization Meetings:      Marital Status:    Intimate Partner Violence:      Fear of Current or Ex-Partner:      Emotionally Abused:      Physically Abused:      Sexually Abused:        ROS:   CONSTITUTIONAL:  No fevers or chills  EYES: negative for icterus  ENT:  negative for hearing loss, tinnitus and sore throat  RESPIRATORY:  negative for cough, sputum, dyspnea  CARDIOVASCULAR:  negative for chest pain  GASTROINTESTINAL:  negative for nausea, vomiting, diarrhea or constipation  GENITOURINARY:  negative for incontinence, dysuria, bladder emptying problems  HEME:  No easy bruising  INTEGUMENT:  negative for rash and pruritus  NEURO:  Negative for headache, seizure disorder    Allergies:   No Known Allergies    Medications:  Prescription Medications as of 8/2/2021       Rx Number Disp Refills Start End Last Dispensed Date Next Fill Date Owning Pharmacy    ANASTROZOLE PO            Sig: Take by mouth daily    Class:  Historical    Route: Oral    calcium-vitamin D-vitamin K (VIACTIV) 500-500-40 MG-UNT-MCG CHEW            Sig: Take 1 tablet by mouth 2 times daily    Class: Historical    Route: Oral    cetirizine (ZYRTEC) 5 MG tablet            Sig: Take 5 mg by mouth daily    Class: Historical    Route: Oral    fish oil-omega-3 fatty acids 1000 MG capsule            Sig: Take 1 g by mouth 2 times daily    Class: Historical    Route: Oral    Glucosamine Sulfate 1500 MG PACK            Sig: Take 1,500 mg by mouth 2 times daily    Class: Historical    Route: Oral    Pediatric Multivitamins-Fl (MULTIVITAMIN WITH 1 MG FLUORIDE) 1 MG CHEW            Sig: Take 1 tablet by mouth daily    Class: Historical    Route: Oral          Labs:   ABO: B POS  Chemistries:   Recent Labs   Lab Test 07/16/21  0729      POTASSIUM 3.6   CHLORIDE 110*   CO2 25   ANIONGAP 4   GLC 89  92   BUN 13   CR 0.75  0.74   SHERIE 9.1       Urine Studies:   Recent Labs   Lab Test 07/16/21  1321 07/16/21  0738   COLOR Straw Yellow   APPEARANCE Clear Slightly Cloudy*   URINEGLC Negative Negative   URINEBILI Negative Negative   URINEKETONE Negative Negative   SG 1.004 1.014   UBLD Negative Negative   URINEPH 7.0 7.0   PROTEIN Negative Negative   NITRITE Negative Negative   LEUKEST Negative Negative   RBCU 2 7*   WBCU 1 <1     Recent Labs   Lab Test 07/16/21  0738   UTPG 0.08   MICROL 12       Hematology:      Recent Labs   Lab Test 07/16/21  0729   WBC 3.3*   RBC 3.92   HGB 12.6  12.6   HCT 36.8   MCV 94   MCH 31.9   MCHC 34.4   RDW 13.2          Coags:   Recent Labs   Lab Test 07/16/21  1303   INR 1.08       Lipid Profile:   Cholesterol   Date Value Ref Range Status   07/16/2021 201 (H) <200 mg/dL Final     Comment:     Age 0-19 years  Desirable: <170 mg/dL  Borderline high:  170-199 mg/dl  High:            >199 mg/dl    Age 20 years and older  Desirable: <200 mg/dL     Triglycerides   Date Value Ref Range Status   07/16/2021 73 <150 mg/dL Final      Comment:     0-9 years:  Normal:    Less than 75 mg/dL  Borderline high:  75-99 mg/dL  High:             Greater than or equal to 100 mg/dL    0-19 years:  Normal:    Less than 90 mg/dL  Borderline high:   mg/dL  High:             Greater than or equal to 130 mg/dL    20 years and older:  Normal:    Less than 150 mg/dL  Borderline high:  150-199 mg/dL  High:             200-499 mg/dL  Very high:   Greater than or equal to 500 mg/dL     Direct Measure HDL   Date Value Ref Range Status   07/16/2021 67 >=50 mg/dL Final     Comment:     0-19 years:       Greater than or equal to 45 mg/dL   Low: Less than 40 mg/dL   Borderline low: 40-44 mg/dL     20 years and older:   Female: Greater than or equal to 50 mg/dL   Male:   Greater than or equal to 40 mg/dL          LDL Cholesterol Calculated   Date Value Ref Range Status   07/16/2021 119 (H) <=100 mg/dL Final     Comment:     Age 0-19 years:  Desirable: 0-110 mg/dL   Borderline high: 110-129 mg/dL   High: >= 130 mg/dL    Age 20 years and older:  Desirable: <100mg/dL  Above desirable: 100-129 mg/dL   Borderline high: 130-159 mg/dL   High: 160-189 mg/dL   Very high: >= 190 mg/dL     Non HDL Cholesterol   Date Value Ref Range Status   07/16/2021 134 (H) <130 mg/dL Final     Comment:     0-19 years:  Desirable:        Less than 120 mg/dL  Borderline high:  120-144 mg/dL  High:                 Greater than or equal to 145 mg/dL    20 years and older:  Desirable:        130 mg/dL  Above Desirable:130-159 mg/dL  Borderline high:  160-189 mg/dL  High:             190-219 mg/dL  Very high:   Greater than or equal to 220 mg/dL       Virals:  CMV and EBV pending.     Recent Labs   Lab Test 07/16/21  1303   HBCAB Nonreactive   HEPBANG Nonreactive        Hepatitis C Antibody   Date Value Ref Range Status   07/16/2021 Nonreactive Nonreactive Final       Hepatitis C Antibody   Date Value Ref Range Status   07/16/2021 Nonreactive Nonreactive Final

## 2021-08-11 ENCOUNTER — DOCUMENTATION ONLY (OUTPATIENT)
Dept: TRANSPLANT | Facility: CLINIC | Age: 61
End: 2021-08-11

## 2021-08-11 NOTE — PROGRESS NOTES
IKTP meeting review    Case reviewed by interdisciplinary team at IKTP meeting. Virtual cross match reviewed, no DSA. Team okay with direct donation if PRA remains stable.     Recipient team present and will obtain and updated PRA. Recipient team to update donor team with any changes.

## 2021-08-14 ENCOUNTER — TRANSFERRED RECORDS (OUTPATIENT)
Dept: HEALTH INFORMATION MANAGEMENT | Facility: CLINIC | Age: 61
End: 2021-08-14

## 2021-08-14 LAB — EJECTION FRACTION: 68 %

## 2021-08-18 ENCOUNTER — DOCUMENTATION ONLY (OUTPATIENT)
Dept: TRANSPLANT | Facility: CLINIC | Age: 61
End: 2021-08-18

## 2021-08-18 ENCOUNTER — COMMITTEE REVIEW (OUTPATIENT)
Dept: TRANSPLANT | Facility: CLINIC | Age: 61
End: 2021-08-18

## 2021-08-18 NOTE — PHARMACY-MEDICATION REGIMEN REVIEW
Pharmacy Living Kidney Donor Pre-Surgery Medication Evaluation     This patient is a 61 year old female being considered for living kidney donation.  As part of the donor pre-surgery patient evaluation, pharmacy has screened this patient's electronic medical record for medication related concerns.      Assessment / Plan    The following medication related issues may be of possible concern for this patient post surgery, based on the medical record medication list review.     Glucosamine-Chondroitin: May increase bleed risk by inhibiting platelet aggregation. Would caution restarting if patient were to initiate on antiplatelet or anticoagulation post donation.    Some resources recommend stopping herbal supplements that have potential to increase bleed risk 7 days prior to surgery.    Pharmacy will continue to participate in this patient's care throughout the surgery course. Please contact pharmacy with any further medication related questions or concerns.       Jamey Escudero, Pharm.D.  [facility]  [phone]

## 2021-08-18 NOTE — LETTER
Ms. Barker Javier   [address] [address]   August 18, 2021     Dear Lucero   I am writing on behalf of the Transplant Program at the [facility]. I would like to take this opportunity to thank you for recently undergoing an evaluation as a possible kidney donor.   Your evaluation is complete and your test results have been reviewed and discussed by the donor team at our weekly meeting. I am pleased to tell you that the team has determined that you meet our criteria for living kidney donation and you have been approved to donate. I would be happy to review any specific details of your evaluation testing with you and to discuss the next steps in this process. Thank you.   Sincerely,   Oriana Taylor, RN  Living Donor Program  [facility], [facility]  Tele: [phone] or [phone], ext. [phone]

## 2021-08-18 NOTE — COMMITTEE REVIEW
Living Donor Committee Review Note Evaluation Date: 6/29/2021  Committee Review Date: 8/18/2021    Donor being evaluated for: Kidney    Transplant Phase: Evaluation  Transplant Status: Active    Transplant Coordinator: Oriana Taylor  Transplant Surgeon:       Committee Review Members:  Nephrology Crystal Sadler MD, Tariq Mckee MD, Gurwinder Sawyer MD   Nutrition Claire Mckinney, EMERALD   Pharmacist Jamey Escudero, [facility]    - Clinical Shirley Graham, [facility]   Transplant Pamela Jess Rizo, RN, Terra Kelley, RN, Fawn Bain LPN, Madonna Weber, RN, Oriana Taylor, RN, Gita Calvillo, RN   Transplant Surgery Ian Faust MD       Transplant Eligibility: Acceptable Mental Health, Acceptable Physical Health    Committee Review Decision: Approved    Relative Contraindications: None    Absolute Contraindications: None    Committee Chair Crystal Sadler MD verbally attested to the committee's decision.    Committee Discussion Details:      Reviewed Nahomi's evaluation findings:    -24hour BP monitor- okay, no further evaluation needed    -Repeat EKG done 8/11/21- okay, no further evaluation needed    -Stress echo done 8/14/21 and echo done 7/16/21 reviewed- okay, no further cardiac evaluation needed    -Repeat CBC done 8/9/21- okay, no further evaluation needed    -Repeat UA done 8/9/21- okay, no further evaluation needed    -CXR- reviewed, no further evaluation needed    Also of note:   CT previously reviewed and discussed-  July 20, 2021 4:59 PM -  RBELINA1:   Patient's abdominal CT reviewed on 7/20/21 with the following surgeon(s) present: Dr. Hamm  Suggested side for kidney for donation is LEFT.   Incidental findings reviewed: liver hypodensities- okay, no further follow up needed    Donor approved    Next Steps:    Call patient to discuss results/reccomendations     If patient interested in continuing in donor eval, send appropriate orders/billing info    Nahomi updated of  above.She confirmed her desire to proceed. She will update her recipient. MAGDALENA sent to recipient team.

## 2021-09-03 ENCOUNTER — TRANSFERRED RECORDS (OUTPATIENT)
Dept: HEALTH INFORMATION MANAGEMENT | Facility: CLINIC | Age: 61
End: 2021-09-03

## 2021-10-11 ENCOUNTER — HEALTH MAINTENANCE LETTER (OUTPATIENT)
Age: 61
End: 2021-10-11

## 2021-11-11 ENCOUNTER — TELEPHONE (OUTPATIENT)
Dept: TRANSPLANT | Facility: CLINIC | Age: 61
End: 2021-11-11

## 2021-11-15 NOTE — TELEPHONE ENCOUNTER
Nahomi ready to scheduling living donor surgery, she would like to move forward with 1/26/21. Recipient team updated. Plan to confirm pre op plan once surgeon scheduled confirmed. Nahomi comfortable with this plan. COVID vaccinations confirmed, snap shot updated.

## 2021-11-30 NOTE — TELEPHONE ENCOUNTER
Coordinator called pt to go over instructions for upcoming kidney donation surgery on 1/27/21. Nahomi confirmed her desire to proceed as a donor.     Pt will have pre-op appointments 1/24/22 including PAC per Dr. Faust's recommendation. She will get day minus 7-10 pre op COVID locally and fax results. Additionally, we will send her a blood draw kit for the final cross match and virals that she will arrange to be drawn on 1/17/22. Reviewed OR check in time at 5:00am and OR start time of 7:30am .     Recommended 14 day pre op self isolation and strict COVID precautions reviewed.     Reviewed general components of inpatient stay and post op routines including need to stay locally until follow up with surgeon completed (plan for post op follow up on 2/7/22 with Dr. Faust)    Medication list reviewed: She is on anastrazole and glucosamine chondroitin will review with Dr. Faust if necessary to adjust schedule perioperatively     FMLA/work paper work reviewed: she will send me any necessary paperwork for completion, Donor Shield enrollment reviewed.    Nahomi verbalized understanding and comfort with plan, denied questions.

## 2021-12-06 DIAGNOSIS — Z52.4 ENCOUNTER FOR DONATION OF KIDNEY: Primary | ICD-10-CM

## 2021-12-06 NOTE — LETTER
Lucero Herrera  [address] [address]      Dear Lucero Herrera:    You have been scheduled for kidney donation surgery on 1/27/22.    Please note: COVID 19 testing is required prior to your surgery.     You will have two COVID tests as part of your pre- op visits. Please strictly follow COVID 19 safety guidelines and self-quarantine for 14 days prior to your surgery to minimize your potential for exposure. A positive COVID 19 test will result in postponement of surgery.      Please stay up to date on [facility] Guidelines and visitor restrictions for COVID 19 at [email or web address].    You have three pre-op visits:    1.)  1/17/22 at your local clinic    Lab kit draw, please ensure they are sent back to us via [employer] with same day     COVID PCR test, please coordinate this with your local provider and fax results    2.)  1/24/22 at [facility], see [facility] for times     Lab, chest x-ray, anesthesia clinic, surgery nurse practitioner, Dr. Faust and coordinatcr    Please report to the Lab/Imaging area on the 1st Floor of the [facility].  You do not need to fast.     3.) 1/26/22 at [facility], [facility]    COVID PCR test    If you have a Living Will or Power of  appointed, please bring a copy of this paperwork with you. If you do not, please contact your donor  by calling [phone].    On the day of surgery, please report to [address], at [facility], [address] [place] [address]  [id number], by 5:30 a.m.      Some additional restrictions before surgery:    Do not take any aspirin, ibuprofen, or similar medications one week prior to surgery.    Women who are on hormone replacement therapy or birth control pills should stop these medications one month prior to surgery.  You may resume them one month after surgery. Please use an alternative form of birth  control.    Please contact us if you develop symptoms of a cold or any infection in the two weeks prior to surgery.    Do not obtain the flu shot or any other immunizations 72 hours prior to your surgery date.    If you have any questions or concerns, please contact [facility] at [phone].  We are available Monday through Friday, from 6:00 a.m. to 4:30 p.m.    Thank you for choosing the [facility] to help you donate the Gift of Life.    Sincerely,    Oriana Taylor RN  Transplant Coordinator                                                                                          [facility]     Address:      [address] [place] [id number]        Phone Numbers:  Recipient line:  [phone]  Donor line:  [phone]        Directions:  1. [address] to [address], [place], and merge into the left turn [place].  2. Turn left on [facility] [address]. The [facility] will be on your right. Turn into the driveway for  parking service.    Parking:     parking: We encourage patients and visitors to use convenient  parking service at the [facility]. Enter the main arrival plaza from [facility].  cost is a $6 flat rate fee, regardless of your length of visit. No tips are accepted.     Self-parking: Enter the main arrival plaza at the [facility] from [facility]. From there, parking attendants will direct you to the best self-parking option. Bring your parking tickets inside with you and pay for parking before leaving the Center to get the best parking rate.                Enhanced Recovery after Surgery   For Living Kidney Donors      Drink Ensure evening of 1/26/22  before bedtime           NO SOLID FOODS after bedtime    Drink Ensure when wake up 1/27/22 prior to 05:30          CHECK IN TO 3C at 5am    NOTHING to EAT or DRINK after 5am           START TIME:  7:30am for surgery    This is a team effort,  including you, to get you back on your feet, eating and drinking normally and out of the hospital as quickly as possible. The goals are:  1. NO INFECTIONS  2. RETURN TO A NORMAL DIET    How can we achieve these goals?  1. STAY ACTIVE: Walk every day before your surgery; try to increase the amount every day. Walk after surgery as much as you can-the nurses will help you. Walking speeds healing and gets you home quicker; you heal better at home and have less risk of infection.  2. INCENTIVE SPIROMETER: Practice your incentive spirometer 4 times per day with 5 repetitions each time. Using the incentive spirometer can strengthen your muscles between your ribs and help you have a strong cough after surgery. A more effective cough can help prevent problems with your lungs.  3. STAY HYDRATED: Drink clear liquids up until 2 hours before your surgery. We will give you a special drink that will keep you hydrated.  Drink this before bedtime and on the way into the hospital. Keeping well hydrated leads to your veins being plump, you wake up faster, and you are less likely to be nauseated. Start drinking water as soon as you can after surgery and advance to clear liquids and food as tolerated. IV fluids contain salt, drinking fluids will minimize the amount of IV fluids you need and decrease the amount of salt you get.   Staying hydrated after you go home from the hospital is very important. Ensure Clear or Gatorade are good options to keep you hydrated.  4. PAIN MANAGEMENT: If we minimize the amount of opioids and narcotics, and use regional blocks (which numb the area where your surgery is) along with oral pain medications; you will have less side effects of nausea and constipation. Narcotics can slow down your bowels and cause you to stay in the hospital longer.    Our goal is to keep you comfortable; eating and drinking normally and back home safely.    Showering before Surgery    Your surgeon has asked you to take 2 showers  before surgery.    Why is this important?    It is normal for bacteria (germs) to be on your skin. The skin protects us from these germs. When you have surgery, we cut the skin. Sometimes germs get into the cuts and cause infection (illness caused by germs). By following the instructions below and using special soap, you will lower the number of germs on your skin. This decreases your chance of infection.    Special Soap    You will be provided with 8 ounces of antiseptic surgical soap called 4% CHG. Common name brands of this soap are Hibiclens and Exidine    A note about shaving:  Do not shave within 12 inches of your incision (surgical Cut) area for at least 3 days before surgery. Shaving can make small cuts in the skin. This puts you at a higher risk of infection    Items you will need for each shower:    1 newly washed towel    4 ounces of one of the recommended soaps    Clean pajamas or clothing to change into    Follow these instructions:    Follow these steps the evening before surgery and the morning of surgery.  1. Wash your hair and body with your regular shampoo and soap. Make sure you rinse the shampoo and soap from your hair and body.  2. Using clean hands, apply about 2 ounces of surgery soap on your skin from the neck to your toes. Use on your groin area last. DO NOT use this soap on your face or head. If you get any soap in your eyes, ears or mouth, rinse right away.  3. Repeat step 2. It is very important to let the soap stay on your skin for at least 1 minute.  4. Rinse well and dry off using a clean towel. If you feel any tingling, itching or other irritation, rinse right away. It is normal to feel some coolness on the skin after using the antiseptic soap. Your skin may feel a bit dry after the shower, but DO NOT use any lotions, creams or moisturizers. Do not use hair spray or other products in your hair.  5. Dress in freshly washed clothes or pajamas.                          Repeat these steps  the morning of surgery

## 2021-12-06 NOTE — LETTER
PHYSICIAN ORDERS      DATE & TIME ISSUED: 2021 9:14 AM  PATIENT NAME: Lucero Herrera   : 1960     [facility] MR# [if applicable]: [id number]     DIAGNOSIS:  kidney donor  ICD-10 CODE: Z52.4   Please draw enclosed tubes (2 lavender and 2 red) that will be used to run the following labs at our facility. Please initial and date all tubes. Spin do not pour the red tubes. Place cold pack in shipping envelope with tubes.     [x] HBV HCV HIV and WNV by BLANCA   [x] Hepatitis B Core Yajaira  [x] Hepatitis B Surface Agn    [x] Hepatitis C  Yajaira   [x] HIV 1&2 Yajaira  [x] CMV Antibody IgG    See shipping and handling instructions on enclosed lab slip. Send blood to:    [facility]  [address], [address] ([id number])  [place] [address]    Any questions please call: Oriana Taylor at [phone]  .

## 2021-12-06 NOTE — LETTER
REIMBURSEMENT INFORMATION FOR LIVING ORGAN DONORS    LIVING ORGAN DONOR: This form MUST accompany & remain attached to Orders &  given to Provider and/or Healthcare Facility Business Office    PROVIDER/FACILITY INSTRUCTIONS: By accepting to perform these services for living organ  donation, the provider/facility agrees to exclusively bill the [facility] instead of billing  the patient or any insurance provider and agrees to accept the reimbursement, as described below, as  payment in full for services rendered.    PROVIDER BILLING INSTRUCTIONS:  1. [facility] agrees to pay for all authorized testing ordered by our transplant  program that is related to living organ donation. The attached orders/tests are part of the donor  Evaluation.    2. Do not bill the donor or donor's insurance. Send an itemized invoice, claim or statement to:    [facility]  Transplant Finance/Donor Billing  [address] [place] [address]    3. Billing statements must include the patient first and last name, date of birth, the CPT procedure code  and date of service. Please bill service on the ORIGINAL UBO4 or 1500 with appropriate CPT/HCPCS  codes along with W-9 and send to the above address to insure timely reimbursement.    4. Claims should be submitted no later than six months from the date when services are rendered.  Claims denied for late submission should not be billed to the donor or their private insurance carrier.    5. [facility] will reimburse all charges at 100% of the Medicare Fee Schedule as  defined in the Code of Federal Regulations (CFR) 42, Chapter IV. This is to be considered payment  in full. [facility], the patient, and/or the patient's insurance are NOT to  be billed any balance, co-payment, or deductible, per Medicare regulations. **ATTN: Facility  providing services for attached/enclosed Living Donor Orders; If facility does  NOT AGREE to  the reimbursement rate stated above, PLEASE DENY SERVICES & refer Donor/patient back to  their [facility] Coord[facility] Transplant Center.    6. Patients are NOT to make any payments at the time of service.    Please forward this information to your billing department so that a donor account can be set up with  these instructions.    Should you have any questions, please contact the Donor Billing office at [phone],  Monday - Friday, 8:00 a.m. to 4:00 p.m.   Thank you for your assistance.

## 2021-12-06 NOTE — LETTER
PHYSICIAN ORDERS    DATE & TIME ISSUED: 2021 9:31 AM  PATIENT NAME: Lucero Herrera   : 1960     [facility] MR# [if applicable]: [id number]     DIAGNOSIS:  kidney donor  ICD-10 CODE: Z52.4     1 Please draw enclosed tubes (2 lavender and 2 red) that will be used to run the following labs at our facility. Please initial and date all tubes. Spin do not pour the red tubes. Place cold pack in shipping envelope with tubes.     [x] HBV HCV HIV and WNV by BLANCA   [x] Hepatitis B Core Yajaira  [x] Hepatitis B Surface Agn    [x] Hepatitis C  Yajaira   [x] HIV 1&2 Yajaira  [x] CMV Antibody IgG    2. Send blood to:    [facility]  [address] ([id number])  [place] [address]    Any questions please call: Oriana Taylor at [phone]    .

## 2021-12-06 NOTE — PROGRESS NOTES
The following surgery plan confirmed with Nahomi:     1/17/22: Local lab draw of send out kit for final cross match and virals, [facility] Local COVID PCR test, she will send results.     Nahomi knows to follow strict COVID precautions while traveling. She plans to wear an N95 or KN95 mask.     1/24/22 at [facility]: lab, CXR, PAC, Rain Arvizu NP, Dr. Faust    1/26/22 at [facility]: COVID test    Post op planned for 2/7/22 with Dr. Faust. Nahomi will plan to return home after this visit if okay. She knows she will need to get up and walk at least every 40 minutes while traveling.     Dr. Faust also recommending holding Anastrazol for 3 weeks pre and 3 weeks post operatively. No need to hold glucosamine. Nahomi updated of medication recommendations. She verbalized understanding and comfort with plan. She will send FYI to her oncologist as well.

## 2021-12-06 NOTE — LETTER
PHYSICIAN ORDERS      DATE & TIME ISSUED: 2021 9:36 AM  PATIENT NAME: Lucero Herrera   : 1960     [facility] MR# [if applicable]: [id number]     DIAGNOSIS:  kidney donor  ICD-10 CODE: Z52.4     Final Pre-Transplant Crossmatch [Donor]    1. Please draw enclosed tubes (4 yellow/ACD tubes) to equal 40ml of blood for final pre-transplant crossmatch that will be run at our facility. Please initial and date all tubes. Ship at room temperature.     **Do not draw blood or ship on Friday or Saturday.    2. Send blood to:  Immunology Laboratory     [facility]     [address] [place] [address]    Any questions please call: Oriana Taylor at [phone]    .

## 2021-12-13 NOTE — TELEPHONE ENCOUNTER
FUTURE VISIT INFORMATION      SURGERY INFORMATION:    Date: 2022    Location: [facility] OR    Surgeon:  Ian Faust MD    Anesthesia Type:  General    Procedure: Laparoscopic Hand Assisted Living Related Kidney Donor    Consult:     RECORDS REQUESTED FROM:       Primary Care Provider:   Trace Beard MD   [phone] (Work)    [phone] (Fax)      Most recent EKG+ Tracin2021    Most recent ECHO: 2021

## 2021-12-16 ENCOUNTER — DOCUMENTATION ONLY (OUTPATIENT)
Dept: TRANSPLANT | Facility: CLINIC | Age: 61
End: 2021-12-16

## 2021-12-16 NOTE — PROGRESS NOTES
Sent Donor final with hep/Virals to home address [employer] tracking# [id number] Return to Immunology 7th floor tracking #[id number]  Both with over night status (virals can be walked over from immunology dept to Acute care lab)

## 2022-01-03 ENCOUNTER — DOCUMENTATION ONLY (OUTPATIENT)
Dept: TRANSPLANT | Facility: CLINIC | Age: 62
End: 2022-01-03

## 2022-01-04 ENCOUNTER — TELEPHONE (OUTPATIENT)
Dept: TRANSPLANT | Facility: CLINIC | Age: 62
End: 2022-01-04

## 2022-01-04 NOTE — TELEPHONE ENCOUNTER
I spoke with Nahomi and let her know about the e-mail invitation, which she said she received, to sign up for donor shield benefits.  She is donating directly to her sister on 1/27/22.  She thanked me for the call.  JOHN Sol, [facility]  Living Donor   [facility]  Pager:  [phone]  Direct:  [phone] Cell Phone  E-Mail:  [email or web address]

## 2022-01-18 ENCOUNTER — LAB (OUTPATIENT)
Dept: LAB | Facility: CLINIC | Age: 62
End: 2022-01-18

## 2022-01-18 DIAGNOSIS — Z52.4 ENCOUNTER FOR DONATION OF KIDNEY: ICD-10-CM

## 2022-01-18 PROCEDURE — 86803 HEPATITIS C AB TEST: CPT

## 2022-01-18 PROCEDURE — 87340 HEPATITIS B SURFACE AG IA: CPT

## 2022-01-18 PROCEDURE — 86644 CMV ANTIBODY: CPT

## 2022-01-18 PROCEDURE — 86704 HEP B CORE ANTIBODY TOTAL: CPT

## 2022-01-18 PROCEDURE — 87798 DETECT AGENT NOS DNA AMP: CPT

## 2022-01-19 DIAGNOSIS — Z52.4 ENCOUNTER FOR DONATION OF KIDNEY: ICD-10-CM

## 2022-01-19 LAB
HBV CORE AB SERPL QL IA: NONREACTIVE
HBV SURFACE AG SERPL QL IA: NONREACTIVE
HCV AB SERPL QL IA: NONREACTIVE
HIV 1+2 AB+HIV1 P24 AG SERPL QL IA: NONREACTIVE

## 2022-01-20 LAB
CMV IGG SERPL IA-ACNC: <0.2 U/ML
CMV IGG SERPL IA-ACNC: NORMAL
HBV DNA SERPL QL NAA+PROBE: NORMAL
HCV RNA SERPL QL NAA+PROBE: NORMAL
HIV1+2 RNA SERPL QL NAA+PROBE: NORMAL
WNV RNA SERPL DONR QL NAA+PROBE: NORMAL

## 2022-01-24 ENCOUNTER — OFFICE VISIT (OUTPATIENT)
Dept: TRANSPLANT | Facility: CLINIC | Age: 62
End: 2022-01-24
Attending: TRANSPLANT SURGERY

## 2022-01-24 ENCOUNTER — ANESTHESIA EVENT (OUTPATIENT)
Dept: SURGERY | Facility: CLINIC | Age: 62
DRG: 660 | End: 2022-01-24

## 2022-01-24 ENCOUNTER — OFFICE VISIT (OUTPATIENT)
Dept: SURGERY | Facility: CLINIC | Age: 62
End: 2022-01-24

## 2022-01-24 ENCOUNTER — ANCILLARY PROCEDURE (OUTPATIENT)
Dept: GENERAL RADIOLOGY | Facility: CLINIC | Age: 62
End: 2022-01-24
Attending: TRANSPLANT SURGERY

## 2022-01-24 ENCOUNTER — ALLIED HEALTH/NURSE VISIT (OUTPATIENT)
Dept: TRANSPLANT | Facility: CLINIC | Age: 62
End: 2022-01-24
Attending: TRANSPLANT SURGERY

## 2022-01-24 ENCOUNTER — LAB (OUTPATIENT)
Dept: LAB | Facility: CLINIC | Age: 62
End: 2022-01-24
Attending: TRANSPLANT SURGERY

## 2022-01-24 ENCOUNTER — PRE VISIT (OUTPATIENT)
Dept: SURGERY | Facility: CLINIC | Age: 62
End: 2022-01-24

## 2022-01-24 VITALS
HEART RATE: 56 BPM | HEIGHT: 62 IN | SYSTOLIC BLOOD PRESSURE: 142 MMHG | WEIGHT: 148.6 LBS | BODY MASS INDEX: 27.34 KG/M2 | TEMPERATURE: 98.3 F | OXYGEN SATURATION: 96 % | RESPIRATION RATE: 20 BRPM | DIASTOLIC BLOOD PRESSURE: 88 MMHG

## 2022-01-24 VITALS
OXYGEN SATURATION: 97 % | WEIGHT: 143 LBS | BODY MASS INDEX: 26.16 KG/M2 | HEART RATE: 50 BPM | DIASTOLIC BLOOD PRESSURE: 86 MMHG | SYSTOLIC BLOOD PRESSURE: 136 MMHG

## 2022-01-24 VITALS
OXYGEN SATURATION: 97 % | BODY MASS INDEX: 26.16 KG/M2 | DIASTOLIC BLOOD PRESSURE: 86 MMHG | HEART RATE: 50 BPM | SYSTOLIC BLOOD PRESSURE: 136 MMHG | WEIGHT: 143 LBS

## 2022-01-24 DIAGNOSIS — Z01.818 PRE-OP EXAMINATION: Primary | ICD-10-CM

## 2022-01-24 DIAGNOSIS — Z52.4 ENCOUNTER FOR DONATION OF KIDNEY: ICD-10-CM

## 2022-01-24 LAB
ABO/RH(D): NORMAL
ALBUMIN UR-MCNC: NEGATIVE MG/DL
ANTIBODY SCREEN: NEGATIVE
APPEARANCE UR: CLEAR
B-HCG SERPL-ACNC: 2 IU/L (ref 0–5)
BILIRUB UR QL STRIP: NEGATIVE
COLOR UR AUTO: ABNORMAL
CREAT SERPL-MCNC: 0.76 MG/DL (ref 0.52–1.04)
GFR SERPL CREATININE-BSD FRML MDRD: 88 ML/MIN/1.73M2
GLUCOSE UR STRIP-MCNC: NEGATIVE MG/DL
HGB BLD-MCNC: 13.6 G/DL (ref 11.7–15.7)
HGB UR QL STRIP: NEGATIVE
KETONES UR STRIP-MCNC: NEGATIVE MG/DL
LEUKOCYTE ESTERASE UR QL STRIP: NEGATIVE
MUCOUS THREADS #/AREA URNS LPF: PRESENT /LPF
NITRATE UR QL: NEGATIVE
PH UR STRIP: 8 [PH] (ref 5–7)
RBC URINE: <1 /HPF
SARS-COV-2 RNA RESP QL NAA+PROBE: NEGATIVE
SP GR UR STRIP: 1 (ref 1–1.03)
SPECIMEN EXPIRATION DATE: NORMAL
UROBILINOGEN UR STRIP-MCNC: NORMAL MG/DL
WBC URINE: 0 /HPF

## 2022-01-24 PROCEDURE — 86900 BLOOD TYPING SEROLOGIC ABO: CPT | Mod: 90 | Performed by: PATHOLOGY

## 2022-01-24 PROCEDURE — 36415 COLL VENOUS BLD VENIPUNCTURE: CPT | Performed by: PATHOLOGY

## 2022-01-24 PROCEDURE — 84702 CHORIONIC GONADOTROPIN TEST: CPT | Performed by: PATHOLOGY

## 2022-01-24 PROCEDURE — 86901 BLOOD TYPING SEROLOGIC RH(D): CPT | Mod: 90 | Performed by: PATHOLOGY

## 2022-01-24 PROCEDURE — 71046 X-RAY EXAM CHEST 2 VIEWS: CPT | Performed by: RADIOLOGY

## 2022-01-24 PROCEDURE — 86850 RBC ANTIBODY SCREEN: CPT | Mod: 90 | Performed by: PATHOLOGY

## 2022-01-24 PROCEDURE — U0005 INFEC AGEN DETEC AMPLI PROBE: HCPCS | Mod: 90 | Performed by: PATHOLOGY

## 2022-01-24 PROCEDURE — 81001 URINALYSIS AUTO W/SCOPE: CPT | Performed by: PATHOLOGY

## 2022-01-24 PROCEDURE — 99205 OFFICE O/P NEW HI 60 MIN: CPT | Performed by: PHYSICIAN ASSISTANT

## 2022-01-24 PROCEDURE — 99000 SPECIMEN HANDLING OFFICE-LAB: CPT | Performed by: PATHOLOGY

## 2022-01-24 PROCEDURE — 99213 OFFICE O/P EST LOW 20 MIN: CPT | Performed by: TRANSPLANT SURGERY

## 2022-01-24 PROCEDURE — 82565 ASSAY OF CREATININE: CPT | Performed by: PATHOLOGY

## 2022-01-24 PROCEDURE — U0003 INFECTIOUS AGENT DETECTION BY NUCLEIC ACID (DNA OR RNA); SEVERE ACUTE RESPIRATORY SYNDROME CORONAVIRUS 2 (SARS-COV-2) (CORONAVIRUS DISEASE [COVID-19]), AMPLIFIED PROBE TECHNIQUE, MAKING USE OF HIGH THROUGHPUT TECHNOLOGIES AS DESCRIBED BY CMS-2020-01-R: HCPCS | Mod: 90 | Performed by: PATHOLOGY

## 2022-01-24 PROCEDURE — 86923 COMPATIBILITY TEST ELECTRIC: CPT

## 2022-01-24 PROCEDURE — 85018 HEMOGLOBIN: CPT | Performed by: PATHOLOGY

## 2022-01-24 RX ORDER — MULTIPLE VITAMINS W/ MINERALS TAB 9MG-400MCG
1 TAB ORAL DAILY
COMMUNITY

## 2022-01-24 ASSESSMENT — PAIN SCALES - GENERAL
PAINLEVEL: NO PAIN (0)
PAINLEVEL: NO PAIN (0)

## 2022-01-24 ASSESSMENT — ENCOUNTER SYMPTOMS: SEIZURES: 0

## 2022-01-24 ASSESSMENT — MIFFLIN-ST. JEOR: SCORE: 1187.3

## 2022-01-24 NOTE — LETTER
1/24/2022         RE: Lucero Herrera  [address] [address]        Dear Colleague,    Thank you for referring your patient, Lucero Herrera, to the [facility] TRANSPLANT [facility]. Please see a copy of my visit note below.    Transplant Surgery H&P                                                        HPI:                                                      Ms. Herrera is a 61 year old female who comes to clinic today for preop prior to planned laparoscopic kidney donation surgery. The patient was previously reviewed by the living donor multidisciplinary selection committee and found to be medically and psychosocially appropriate for voluntary kidney donation. The patient denies any feelings of being pressured to proceed with kidney donation.  Health events since donor evaluation: None    Special considerations:   Constipation: no  PONV: no  History of Urinary retention? no  Significant neck/back/joint concerns for lateral decubitus positioning?: No  Hinduism: No    MEDICAL HISTORY:                                                      Patient Active Problem List    Diagnosis Date Noted     Transplant donor evaluation 06/03/2021     Priority: Medium      Past Medical History:   Diagnosis Date     Arthritis      Breast cancer (H) 2013    Teatment at [place], [place]     CTS (carpal tunnel syndrome)      Heart murmur      Lactose intolerance      Ovarian cyst     benign (per pt)     Past Surgical History:   Procedure Laterality Date     LUMPECTOMY BREAST  2013     OOPHORECTOMY  1996     OVARIAN CYST REMOVAL       Current Outpatient Medications   Medication Sig Dispense Refill     ANASTROZOLE PO Take by mouth daily Holding for surgery        calcium-vitamin D-vitamin K (VIACTIV) 500-500-40 MG-UNT-MCG CHEW Take 1 tablet by mouth every morning        cetirizine (ZYRTEC) 5 MG tablet Take 5 mg by mouth daily Seasonal only       fish oil-omega-3 fatty acids 1000 MG  capsule Take 1 g by mouth 2 times daily       Glucosamine Sulfate 1500 MG PACK Take 1,500 mg by mouth 2 times daily       multivitamin w/minerals (MULTI-VITAMIN) tablet Take 1 tablet by mouth daily       OTC products: None, except as noted above  Allergies   Allergen Reactions     Lactose Cramps, Diarrhea, GI Disturbance and Nausea      Social History     Tobacco Use     Smoking status: Never Smoker     Smokeless tobacco: Never Used   Substance Use Topics     Alcohol use: Yes     Comment: 2 drinks a week     History   Drug Use Unknown       REVIEW OF SYSTEMS:                                                    CONSTITUTIONAL: NEGATIVE for fever, chills, change in weight  INTEGUMENTARY/SKIN: NEGATIVE for worrisome rashes, moles or lesions  EYES: NEGATIVE for vision changes or irritation  ENT/MOUTH: NEGATIVE for ear, mouth and throat problems  RESP: NEGATIVE for significant cough or SOB  CV: NEGATIVE for chest pain, palpitations or peripheral edema  GI: NEGATIVE for nausea, abdominal pain, heartburn, or change in bowel habits  : NEGATIVE for frequency, dysuria, or hematuria  MUSCULOSKELETAL: NEGATIVE for significant arthralgias or myalgia  NEURO: NEGATIVE for weakness, dizziness or paresthesias  ENDOCRINE: NEGATIVE for temperature intolerance, skin/hair changes  HEME: NEGATIVE for bleeding problems  PSYCHIATRIC: NEGATIVE for changes in mood or affect    EXAM:                                                    There were no vitals taken for this visit.    GENERAL APPEARANCE: healthy, alert and no distress     EYES: EOMI, PERRL     HENT: ear canals and TM's normal and nose and mouth without ulcers or lesions     NECK: no adenopathy, no asymmetry, masses, or scars and thyroid normal to palpation     RESP: lungs clear to auscultation - no rales, rhonchi or wheezes     CV: regular rates and rhythm, normal S1 S2, no S3 or S4 and no murmur, click or rub     ABDOMEN:  soft, nontender, no HSM or masses and bowel sounds  normal     MS: extremities normal- no gross deformities noted, no evidence of inflammation in joints, FROM in all extremities.     SKIN: no suspicious lesions or rashes     NEURO: Normal strength and tone, sensory exam grossly normal, mentation intact and speech normal     PSYCH: mentation appears normal. and affect normal/bright     LYMPHATICS: No cervical adenopathy    DIAGNOSTICS:                                                      EKG: Sinus bradycardia   Right axis deviation   Low voltage QRS   Septal infarct , age undetermined   Abnormal ECG   No previous ECGs available   Chest XRay  Labs Resulted Today:   Results for orders placed or performed in visit on 01/24/22   XR Chest 2 Views     Status: None    Narrative    Chest 2 views    INDICATION: Encounter for donation of kidney    COMPARISON: 7/16/2021    FINDINGS: Heart size and shape appear normal. Lungs and pulmonary  vascularity appear normal. Minimal degenerative changes are present in  the midthoracic spine. Surgical clips in the left axilla an apparently  also in the left breast again noted.      Impression    IMPRESSION: Thoracic spondylosis. Prior left breast surgery. No acute  findings otherwise.    AKIRA LEE MD         SYSTEM ID:  [id number]   Results for orders placed or performed in visit on 01/24/22   Creatinine     Status: Normal   Result Value Ref Range    Creatinine 0.76 0.52 - 1.04 mg/dL    GFR Estimate 88 >60 mL/min/1.73m2   Hemoglobin     Status: Normal   Result Value Ref Range    Hemoglobin 13.6 11.7 - 15.7 g/dL   UA with Microscopic reflex to Culture     Status: Abnormal    Specimen: Urine, NOS   Result Value Ref Range    Color Urine Straw Colorless, Straw, Light Yellow, Yellow    Appearance Urine Clear Clear    Glucose Urine Negative Negative mg/dL    Bilirubin Urine Negative Negative    Ketones Urine Negative Negative mg/dL    Specific Gravity Urine 1.002 (L) 1.003 - 1.035    Blood Urine Negative Negative    pH Urine 8.0 (H) 5.0  - 7.0    Protein Albumin Urine Negative Negative mg/dL    Urobilinogen Urine Normal Normal, 2.0 mg/dL    Nitrite Urine Negative Negative    Leukocyte Esterase Urine Negative Negative    Mucus Urine Present (A) None Seen /LPF    RBC Urine <1 <=2 /HPF    WBC Urine 0 <=5 /HPF    Narrative    Urine Culture not indicated   HCG quantitative pregnancy     Status: Normal   Result Value Ref Range    hCG Quantitative 2 0 - 5 IU/L   Asymptomatic COVID-19 Virus (Coronavirus) by PCR Nose     Status: Normal    Specimen: Nose; Swab   Result Value Ref Range    SARS CoV2 PCR Negative Negative, Testing sent to reference lab. Results will be returned via unsolicited result    Narrative    Testing was performed using the Xpert Xpress SARS-CoV-2 Assay on the  Cepheid Gene-Xpert Instrument Systems. Additional information about  this Emergency Use Authorization (EUA) assay can be found via the Lab  Guide. This test should be ordered for the detection of SARS-CoV-2 in  individuals who meet SARS-CoV-2 clinical and/or epidemiological  criteria. Test performance is unknown in asymptomatic patients. This  test is for in vitro diagnostic use under the FDA EUA for  laboratories certified under CLIA to perform high complexity testing.  This test has not been FDA cleared or approved. A negative result  does not rule out the presence of PCR inhibitors in the specimen or  target RNA in concentration below the limit of detection for the  assay. The possibility of a false negative should be considered if  the patient's recent exposure or clinical presentation suggests  COVID-19. This test was validated by the [facility]. This laboratory is certified under  the Clinical Laboratory Improvement Amendments of 1988 (CLIA-88) as  qualified to perform high complexity laboratory testing.     ABO/Rh type and screen     Status: None (In process)    Narrative    The following orders were created for panel order  ABO/Rh type and screen.  Procedure                               Abnormality         Status                     ---------                               -----------         ------                     Adult Type and Screen[[id number]]                            In process                   Please view results for these tests on the individual orders.     Labs Drawn and in Process:   Unresulted Labs Ordered in the Past 30 Days of this Admission     Date and Time Order Name Status Description    1/24/2022 11:13 AM TYPE AND SCREEN, ADULT In process         Recent Labs   Lab Test 01/24/22  1147 07/16/21  1303 07/16/21  0729   HGB 13.6  --  12.6  12.6   PLT  --   --  205   INR  --  1.08  --    NA  --   --  139   POTASSIUM  --   --  3.6   CR 0.76  --  0.75  0.74   A1C  --   --  5.4      Assessment:                                                    Healthy voluntary kidney donor    There have been no significant intercurrent medical problems or change of intent in proceeding with kidney donation as scheduled on 1/27/2022.    1. Labs reviewed and within normal limits: Yes  2. EKG (7/16/2021): Sinus bradycardia   Right axis deviation   Low voltage QRS   Septal infarct , age undetermined   Abnormal ECG   No previous ECGs available   3. Paired Exchange case: No  4. ABO= B POS  5. Laterality: left  6. Outstanding issues: FInal ABO, cross match, COVID-19    Plan:                                                      1. Consent: Done  2. Outstanding issues: FInal ABO, cross match, COVID-19    Signed Electronically by: Rain Arvizu NP    ATTESTATION:     The patient has been seen and evaluated by me.   Vital signs, labs, medications and orders were reviewed.   When obtained, diagnostic images were reviewed by me and interpreted as above.    The care plan was discussed with the team and I agree with the findings and plan in this note.    Plan left donor nephrectomy.    Risks, benefits, and alternatives discussed in detail       Ian Faust MD, PhD  Associate Professor of Surgery  Abdominal Organ Transplantation    TT: 25 min          Again, thank you for allowing me to participate in the care of your patient.        Sincerely,        Ian Faust MD

## 2022-01-24 NOTE — LETTER
1/24/2022     RE: Lucero Herrera  [address] [address]    Dear Colleague,    Thank you for referring your patient, Lucero Herrera, to the [facility] TRANSPLANT [facility]. Please see a copy of my visit note below.    See surgeon note       Again, thank you for allowing me to participate in the care of your patient.        Sincerely,        Rain Arvizu NP

## 2022-01-24 NOTE — PROGRESS NOTES
Transplant Surgery H&P                                                        HPI:                                                      Ms. Herrera is a 61 year old female who comes to clinic today for preop prior to planned laparoscopic kidney donation surgery. The patient was previously reviewed by the living donor multidisciplinary selection committee and found to be medically and psychosocially appropriate for voluntary kidney donation. The patient denies any feelings of being pressured to proceed with kidney donation.  Health events since donor evaluation: None    Special considerations:   Constipation: no  PONV: no  History of Urinary retention? no  Significant neck/back/joint concerns for lateral decubitus positioning?: No  Baptism: No    MEDICAL HISTORY:                                                      Patient Active Problem List    Diagnosis Date Noted     Transplant donor evaluation 06/03/2021     Priority: Medium      Past Medical History:   Diagnosis Date     Arthritis      Breast cancer (H) 2013    Teatment at [place], [place]     CTS (carpal tunnel syndrome)      Heart murmur      Lactose intolerance      Ovarian cyst     benign (per pt)     Past Surgical History:   Procedure Laterality Date     LUMPECTOMY BREAST  2013     OOPHORECTOMY  1996     OVARIAN CYST REMOVAL       Current Outpatient Medications   Medication Sig Dispense Refill     ANASTROZOLE PO Take by mouth daily Holding for surgery        calcium-vitamin D-vitamin K (VIACTIV) 500-500-40 MG-UNT-MCG CHEW Take 1 tablet by mouth every morning        cetirizine (ZYRTEC) 5 MG tablet Take 5 mg by mouth daily Seasonal only       fish oil-omega-3 fatty acids 1000 MG capsule Take 1 g by mouth 2 times daily       Glucosamine Sulfate 1500 MG PACK Take 1,500 mg by mouth 2 times daily       multivitamin w/minerals (MULTI-VITAMIN) tablet Take 1 tablet by mouth daily       OTC products: None, except as noted above  Allergies    Allergen Reactions     Lactose Cramps, Diarrhea, GI Disturbance and Nausea      Social History     Tobacco Use     Smoking status: Never Smoker     Smokeless tobacco: Never Used   Substance Use Topics     Alcohol use: Yes     Comment: 2 drinks a week     History   Drug Use Unknown       REVIEW OF SYSTEMS:                                                    CONSTITUTIONAL: NEGATIVE for fever, chills, change in weight  INTEGUMENTARY/SKIN: NEGATIVE for worrisome rashes, moles or lesions  EYES: NEGATIVE for vision changes or irritation  ENT/MOUTH: NEGATIVE for ear, mouth and throat problems  RESP: NEGATIVE for significant cough or SOB  CV: NEGATIVE for chest pain, palpitations or peripheral edema  GI: NEGATIVE for nausea, abdominal pain, heartburn, or change in bowel habits  : NEGATIVE for frequency, dysuria, or hematuria  MUSCULOSKELETAL: NEGATIVE for significant arthralgias or myalgia  NEURO: NEGATIVE for weakness, dizziness or paresthesias  ENDOCRINE: NEGATIVE for temperature intolerance, skin/hair changes  HEME: NEGATIVE for bleeding problems  PSYCHIATRIC: NEGATIVE for changes in mood or affect    EXAM:                                                    There were no vitals taken for this visit.    GENERAL APPEARANCE: healthy, alert and no distress     EYES: EOMI, PERRL     HENT: ear canals and TM's normal and nose and mouth without ulcers or lesions     NECK: no adenopathy, no asymmetry, masses, or scars and thyroid normal to palpation     RESP: lungs clear to auscultation - no rales, rhonchi or wheezes     CV: regular rates and rhythm, normal S1 S2, no S3 or S4 and no murmur, click or rub     ABDOMEN:  soft, nontender, no HSM or masses and bowel sounds normal     MS: extremities normal- no gross deformities noted, no evidence of inflammation in joints, FROM in all extremities.     SKIN: no suspicious lesions or rashes     NEURO: Normal strength and tone, sensory exam grossly normal, mentation intact and speech  normal     PSYCH: mentation appears normal. and affect normal/bright     LYMPHATICS: No cervical adenopathy    DIAGNOSTICS:                                                      EKG: Sinus bradycardia   Right axis deviation   Low voltage QRS   Septal infarct , age undetermined   Abnormal ECG   No previous ECGs available   Chest XRay  Labs Resulted Today:   Results for orders placed or performed in visit on 01/24/22   XR Chest 2 Views     Status: None    Narrative    Chest 2 views    INDICATION: Encounter for donation of kidney    COMPARISON: 7/16/2021    FINDINGS: Heart size and shape appear normal. Lungs and pulmonary  vascularity appear normal. Minimal degenerative changes are present in  the midthoracic spine. Surgical clips in the left axilla an apparently  also in the left breast again noted.      Impression    IMPRESSION: Thoracic spondylosis. Prior left breast surgery. No acute  findings otherwise.    AKIRA LEE MD         SYSTEM ID:  [id number]   Results for orders placed or performed in visit on 01/24/22   Creatinine     Status: Normal   Result Value Ref Range    Creatinine 0.76 0.52 - 1.04 mg/dL    GFR Estimate 88 >60 mL/min/1.73m2   Hemoglobin     Status: Normal   Result Value Ref Range    Hemoglobin 13.6 11.7 - 15.7 g/dL   UA with Microscopic reflex to Culture     Status: Abnormal    Specimen: Urine, NOS   Result Value Ref Range    Color Urine Straw Colorless, Straw, Light Yellow, Yellow    Appearance Urine Clear Clear    Glucose Urine Negative Negative mg/dL    Bilirubin Urine Negative Negative    Ketones Urine Negative Negative mg/dL    Specific Gravity Urine 1.002 (L) 1.003 - 1.035    Blood Urine Negative Negative    pH Urine 8.0 (H) 5.0 - 7.0    Protein Albumin Urine Negative Negative mg/dL    Urobilinogen Urine Normal Normal, 2.0 mg/dL    Nitrite Urine Negative Negative    Leukocyte Esterase Urine Negative Negative    Mucus Urine Present (A) None Seen /LPF    RBC Urine <1 <=2 /HPF    WBC Urine  0 <=5 /HPF    Narrative    Urine Culture not indicated   HCG quantitative pregnancy     Status: Normal   Result Value Ref Range    hCG Quantitative 2 0 - 5 IU/L   Asymptomatic COVID-19 Virus (Coronavirus) by PCR Nose     Status: Normal    Specimen: Nose; Swab   Result Value Ref Range    SARS CoV2 PCR Negative Negative, Testing sent to reference lab. Results will be returned via unsolicited result    Narrative    Testing was performed using the Xpert Xpress SARS-CoV-2 Assay on the  Cepheid Gene-Xpert Instrument Systems. Additional information about  this Emergency Use Authorization (EUA) assay can be found via the Lab  Guide. This test should be ordered for the detection of SARS-CoV-2 in  individuals who meet SARS-CoV-2 clinical and/or epidemiological  criteria. Test performance is unknown in asymptomatic patients. This  test is for in vitro diagnostic use under the FDA EUA for  laboratories certified under CLIA to perform high complexity testing.  This test has not been FDA cleared or approved. A negative result  does not rule out the presence of PCR inhibitors in the specimen or  target RNA in concentration below the limit of detection for the  assay. The possibility of a false negative should be considered if  the patient's recent exposure or clinical presentation suggests  COVID-19. This test was validated by the [facility]. This laboratory is certified under  the Clinical Laboratory Improvement Amendments of 1988 (CLIA-88) as  qualified to perform high complexity laboratory testing.     ABO/Rh type and screen     Status: None (In process)    Narrative    The following orders were created for panel order ABO/Rh type and screen.  Procedure                               Abnormality         Status                     ---------                               -----------         ------                     Adult Type and Screen[[id number]]                            In  process                   Please view results for these tests on the individual orders.     Labs Drawn and in Process:   Unresulted Labs Ordered in the Past 30 Days of this Admission     Date and Time Order Name Status Description    1/24/2022 11:13 AM TYPE AND SCREEN, ADULT In process         Recent Labs   Lab Test 01/24/22  1147 07/16/21  1303 07/16/21  0729   HGB 13.6  --  12.6  12.6   PLT  --   --  205   INR  --  1.08  --    NA  --   --  139   POTASSIUM  --   --  3.6   CR 0.76  --  0.75  0.74   A1C  --   --  5.4      Assessment:                                                    Healthy voluntary kidney donor    There have been no significant intercurrent medical problems or change of intent in proceeding with kidney donation as scheduled on 1/27/2022.    1. Labs reviewed and within normal limits: Yes  2. EKG (7/16/2021): Sinus bradycardia   Right axis deviation   Low voltage QRS   Septal infarct , age undetermined   Abnormal ECG   No previous ECGs available   3. Paired Exchange case: No  4. ABO= B POS  5. Laterality: left  6. Outstanding issues: FInal ABO, cross match, COVID-19    Plan:                                                      1. Consent: Done  2. Outstanding issues: FInal ABO, cross match, COVID-19    Signed Electronically by: Rain Arvizu NP    ATTESTATION:     The patient has been seen and evaluated by me.   Vital signs, labs, medications and orders were reviewed.   When obtained, diagnostic images were reviewed by me and interpreted as above.    The care plan was discussed with the team and I agree with the findings and plan in this note.    Plan left donor nephrectomy.    Risks, benefits, and alternatives discussed in detail      Ian Faust MD, PhD  Associate Professor of Surgery  Abdominal Organ Transplantation    TT: 25 min

## 2022-01-24 NOTE — PATIENT INSTRUCTIONS
Preparing for Your Surgery      Name:  Lucero Herrera   MRN:  [id number]   :  1960   Today's Date:  2022       Arriving for surgery:  Surgery date:  2022  Arrival time:  5:30 am    Restrictions due to COVID 19       Effective 22 [facility] is implementing the following visitor policy:     1 person may accompany the patient through the Pre-Op process.      Then that person will be asked to leave the building.        There will be no visitors in the Surgery Waiting Room.        Inpatients are allowed 1 consistent visitor for the duration of their stay.      Visitors must wear a mask.      Visitors must not be ill.      Visiting hours are 8 am to 8 pm.    [employer] parking is available for anyone with mobility limitations or disabilities.  ([facility]  24 hours/ 7 days a week; [facility]  7 am- 3:30 pm, Mon- Fri)    Please come to:     [facility] [address]  [address] [place] [address]       -    Please proceed to [address] on the 3rd floor. [phone]?     - ?If you are in need of directions, wheelchair or escort please stop at the Information Desk in the lobby.      What can I eat or drink?  -  You may eat and drink normally for up to 8 hours before your surgery. (Until 22 at 11 pm)  -  You may have clear liquids until 2 hours before surgery. (Until 5:30 am arrival time)    Examples of clear liquids:  Water  Clear broth  Juices (apple, white grape, white cranberry  and cider) without pulp  Noncarbonated, powder based beverages  (lemonade and Kelton-Aid)  Sodas (Sprite, 7-Up, ginger ale and seltzer)  Coffee or tea (without milk or cream)  Gatorade    -  No Alcohol for at least 24 hours before surgery     Which medicines can I take?    Hold Aspirin for 7 days before surgery.     Hold Multivitamins for 7 days before surgery.  Hold Supplements (fish oil, glucosamine) for 7 days before surgery.    Hold Ibuprofen (Advil,  Motrin) for 1 day before surgery  Hold Naproxen (Aleve) for 4 days before surgery.    -  DO NOT take these medications the day of surgery:  Calcium       -  PLEASE TAKE these medications the day of surgery:  None       How do I prepare myself?  - Please take 2 showers before surgery using Scrubcare or Hibiclens soap.    Use this soap only from the neck to your toes.     Leave the soap on your skin for one minute--then rinse thoroughly.      You may use your own shampoo and conditioner; no other hair products.   - Please remove all jewelry and body piercings.  - No lotions, deodorants or fragrance.  - No makeup or fingernail polish.   - Bring your ID and insurance card.    -If you have a Deep Brain Stimulator, Spinal Cord Stimulator or any neuro stimulator device---you must bring the remote control to the hospital     - All patients are required to have a Covid-19 test within 4 days of surgery/procedure.      -Patients will be contacted by the [facility] scheduling team within 1 week of surgery to make an appointment.      - Patients may call the Scheduling team at [phone] if they have not been scheduled within 4 days of  surgery.          Questions or Concerns:    - For any questions regarding the day of surgery or your hospital stay, please contact the Pre Admission Nursing Office at [phone].       - If you have health changes between today and your surgery please call your surgeon.       For questions after surgery please call your surgeons office.

## 2022-01-24 NOTE — PROGRESS NOTES
"Saw Nahomi in clinic for pre-op visit.  Surgery is scheduled for 1/27/22.     Expectations for day of surgery and hospitalization, \"What to expect after donation,\" ERAS, pre op shower instructions, and importance of post op follow up reviewed. Insentive Spirometer provided and instructions for use reviewed. All questions addressed.     Updated risk behavior questionnaire obtained and sent for scan. Ensure and pre op scrub provided.     Thanked Nahomi for being a donor. She verbalized understanding and comfort with plan, denied any addtional questions or concerns.    Reviewed plan for pre op COVID testing with Dr. Faust. Plan confirmed for repeat COVID 1/25/22, appt in place. Per Rain Arvizu NP UA okay- no further evaluation needed pre op.   "

## 2022-01-24 NOTE — H&P
Pre-Operative H & P     CC:  Preoperative exam to assess for increased cardiopulmonary risk while undergoing surgery and anesthesia.    Date of Encounter: 1/24/2022  Primary Care Physician:  No Ref-Primary, Physician     Reason for visit:   Encounter Diagnoses   Name Primary?     Encounter for donation of kidney      Pre-op examination Yes       HPI  Lucero Herrera is a 61 year old female who presents for pre-operative H & P in preparation for  Procedure Information     Case: [id number] Date/Time: 01/27/22 0730    Procedure: Laparoscopic Hand Assisted Living Related Kidney Donor (N/A Abdomen)    Anesthesia type: General    Diagnosis: Encounter for donation of kidney [Z52.4]    Pre-op diagnosis: Encounter for donation of kidney [Z52.4]    Location:  OR  /  OR    Providers: Ian Faust MD        The patient is a 61-year-old woman with a past medical history significant for functional murmur, seasonal allergies, lactose intolerance, history of breast cancer and arthritis.  The patient is donating her kidney to her sister and has met with the transplant team to complete the necessary evaluation.  She is meeting with Dr. Faust later today and has been scheduled for the procedure as above.    History is obtained from the patient and chart review    Hx of abnormal bleeding or anti-platelet use: none    Menstrual history: No LMP recorded. Patient is postmenopausal.:      Past Medical History  Past Medical History:   Diagnosis Date     Arthritis      Breast cancer (H) 2013    Teatment at [place], [place]     CTS (carpal tunnel syndrome)      Heart murmur      Lactose intolerance      Ovarian cyst     benign (per pt)     Seasonal allergic rhinitis        Past Surgical History  Past Surgical History:   Procedure Laterality Date     LUMPECTOMY BREAST  2013     OOPHORECTOMY  1996     OVARIAN CYST REMOVAL         Prior to Admission Medications  Current Outpatient Medications   Medication Sig  Dispense Refill     calcium-vitamin D-vitamin K (VIACTIV) 500-500-40 MG-UNT-MCG CHEW Take 1 tablet by mouth every morning        fish oil-omega-3 fatty acids 1000 MG capsule Take 1 g by mouth 2 times daily       Glucosamine Sulfate 1500 MG PACK Take 1,500 mg by mouth 2 times daily       multivitamin w/minerals (MULTI-VITAMIN) tablet Take 1 tablet by mouth daily       ANASTROZOLE PO Take by mouth daily Holding for surgery        cetirizine (ZYRTEC) 5 MG tablet Take 5 mg by mouth daily Seasonal only         Allergies  Allergies   Allergen Reactions     Lactose Cramps, Diarrhea, GI Disturbance and Nausea       Social History  Social History     Socioeconomic History     Marital status:      Spouse name: Not on file     Number of children: Not on file     Years of education: Not on file     Highest education level: Not on file   Occupational History     Not on file   Tobacco Use     Smoking status: Never Smoker     Smokeless tobacco: Never Used   Substance and Sexual Activity     Alcohol use: Yes     Comment: 2 drinks a week     Drug use: Never     Sexual activity: Not on file   Other Topics Concern     Not on file   Social History Narrative     Not on file     Social Determinants of Health     Financial Resource Strain: Not on file   Food Insecurity: Not on file   Transportation Needs: Not on file   Physical Activity: Not on file   Stress: Not on file   Social Connections: Not on file   Intimate Partner Violence: Not on file   Housing Stability: Not on file       Family History  Family History   Problem Relation Age of Onset     Cancer Father      Diabetes Sister      Kidney Disease Sister      Anesthesia Reaction No family hx of      Bleeding Disorder No family hx of      Clotting Disorder No family hx of        Review of Systems  The complete review of systems is negative other than noted in the HPI or here.   ROS/MED HX  ENT/Pulmonary:     (+) allergic rhinitis,     Neurologic:  - neg neurologic ROS  (-) no  "seizures, no CVA and no TIA   Cardiovascular:     (+) -----valvular problems/murmurs function murmur. Previous cardiac testing   Echo: Date: Results:    Stress Test: Date: 8/9/21 Results:    ECG Reviewed: Date: 6/9/21 Results:  Sinus bradycardia with borderline 1st degree AVB, low voltage QRS    Cath: Date: Results:      METS/Exercise Tolerance: >4 METS    Hematologic:  - neg hematologic  ROS     Musculoskeletal:   (+) arthritis (carpal tunnel syndrome),     GI/Hepatic: Comment: Lactose intolerance.  - neg GI/hepatic ROS  (-) GERD   Renal/Genitourinary:  - neg Renal ROS     Endo:  - neg endo ROS     Psychiatric/Substance Use:  - neg psychiatric ROS     Infectious Disease:  - neg infectious disease ROS     Malignancy:   (+) Malignancy, History of Breast.Breast CA Remission status post Surgery and Radiation.        Other:  - neg other ROS             BP (!) 142/88 (BP Location: Right arm, Patient Position: Sitting, Cuff Size: Adult Regular)   Pulse 56   Temp 98.3  F (36.8  C) (Oral)   Resp 20   Ht 1.575 m (5' 2\")   Wt 67.4 kg (148 lb 9.6 oz)   SpO2 96%   Breastfeeding No   BMI 27.18 kg/m      Physical Exam   Constitutional: Awake, alert, cooperative, no apparent distress, and appears stated age.  Eyes: Pupils equal, round and reactive to light, extra ocular muscles intact, sclera clear, conjunctiva normal.  HENT: Normocephalic, oral pharynx with moist mucus membranes, good dentition. No goiter appreciated.   Respiratory: Clear to auscultation bilaterally, no crackles or wheezing.  Cardiovascular: Regular rate and rhythm, normal S1 and S2, and no murmur noted.  Carotids +2, no bruits. No edema. Palpable pulses to radial  DP and PT arteries.   GI: Normal bowel sounds, soft, non-distended, non-tender, no masses palpated, no hepatosplenomegaly.    Lymph/Hematologic: No cervical lymphadenopathy and no supraclavicular lymphadenopathy.  Genitourinary:  defer  Skin: Warm and dry.  No rashes at anticipated surgical " site.   Musculoskeletal: Full ROM of neck. There is no redness, warmth, or swelling of the joints. Gross motor strength is normal.    Neurologic: Awake, alert, oriented to name, place and time. Cranial nerves II-XII are grossly intact. Gait is normal.   Neuropsychiatric: Calm, cooperative. Normal affect.     Prior Labs/Diagnostic Studies   All labs and imaging personally reviewed   Results for GEO AWAN (MRN [id number]) as of 1/24/2022 13:13   Ref. Range 1/24/2022 11:47 1/24/2022 11:54   Creatinine Latest Ref Range: 0.52 - 1.04 mg/dL 0.76    GFR Estimate Latest Ref Range: >60 mL/min/1.73m2 88    HCG Quantitative Serum Latest Ref Range: 0 - 5 IU/L 2    Hemoglobin Latest Ref Range: 11.7 - 15.7 g/dL 13.6    Color Urine Latest Ref Range: Colorless, Straw, Light Yellow, Yellow   Straw   Appearance Urine Latest Ref Range: Clear   Clear   Glucose Urine Latest Ref Range: Negative mg/dL  Negative   Bilirubin Urine Latest Ref Range: Negative   Negative   Ketones Urine Latest Ref Range: Negative mg/dL  Negative   Specific Gravity Urine Latest Ref Range: 1.003 - 1.035   1.002 (L)   pH Urine Latest Ref Range: 5.0 - 7.0   8.0 (H)   Protein Albumin Urine Latest Ref Range: Negative mg/dL  Negative   Urobilinogen mg/dL Latest Ref Range: Normal, 2.0 mg/dL  Normal   Nitrite Urine Latest Ref Range: Negative   Negative   Blood Urine Latest Ref Range: Negative   Negative   Leukocyte Esterase Urine Latest Ref Range: Negative   Negative   WBC Urine Latest Ref Range: <=5 /HPF  0   RBC Urine Latest Ref Range: <=2 /HPF  <1   Mucus Urine Latest Ref Range: None Seen /LPF  Present (A)       EKG 6/9/21  Sinus bradycardia with borderline 1st degree AVB, low voltage QRS    Stress echo 8/9/21        Echo result w/o MOPS: Interpretation SummaryGlobal and regional left ventricular function is normal with an EF of 55-60%.No pericardial effusion is present.Global right ventricular function is normal.No significant valvular abnormalities  present.IVC diameter <2.1 cm collapsing >50% with sniff suggests a normal RA pressureof 3 mmHg.      No flowsheet data found.      The patient's records and results personally reviewed by this provider.     Outside records reviewed from: [facility]        Assessment      Lucero Herrera is a 61 year old female was seen as a PAC referral for risk assessment and optimization for anesthesia.    Plan/Recommendations  Pt will be optimized for the proposed procedure.  See below for details on the assessment, risk, and preoperative recommendations    NEUROLOGY  - No history of TIA, CVA or seizure    ENT  - No current airway concerns.  Will need to be reassessed day of surgery.    CARDIAC  - No history of CAD, Hypertension and Afib   ~ Patient has had elevated blood pressures and has outside records monitoring her blood pressures. She remains on no medications.   - METS (Metabolic Equivalents)  Patient performs 4 or more METS exercise without symptoms  Total Score: 0      RCRI-Low risk: Class 2 0.9% complication rate  Total Score: 1           RCRI: High Risk Surgery        PULMONARY  - Obstructive Sleep Apnea  No current risk of obstructive sleep apnea   GAMAL Low Risk  Total Score: 1           GAMAL: Over 50 ys old      - Denies asthma or inhaler use  - Tobacco History      History   Smoking Status     Never Smoker   Smokeless Tobacco     Never Used   ~ Seasonal allergies - takes Zyrtec PRN    GI  - Lactose intolerance  PONV Medium Risk  Total Score: 2           1 AN PONV: Pt is Female    1 AN PONV: Patient is not a current smoker        /RENAL  - Baseline Creatinine  0.76  ~ History of breast cancer s/p lumpectomy in 2013 and radiation. She is holding her anastrazole for surgery.     ENDOCRINE  - BMI: Body mass index is 27.18 kg/m .  Overweight (BMI 25.0-29.9)  - No history of Diabetes Mellitus    HEME  VTE Low Risk 0.26%  Total Score: 1           VTE: Greater than 59 yrs old      - No history of abnormal  bleeding or antiplatelet use.  ~ Hgb was 13.6     MSK  ~ Arthritis - the patient has CTS. Consideration for careful positioning.     The patient is optimized for their procedure. AVS with information on surgery time/arrival time, meds and NPO status given by nursing staff.          On the day of service:     Prep time: 21 minutes  Visit time: 30 minutes  Documentation time: 10 minutes  ------------------------------------------  Total time: 61 minutes      Tiki Coats PA-C  Preoperative Assessment Center  [facility]  Phone: [phone]  Fax: [phone]

## 2022-01-24 NOTE — ANESTHESIA PREPROCEDURE EVALUATION
Anesthesia Pre-Procedure Evaluation    Patient: Lucero Herrera   MRN: [id number] : 1960        Preoperative Diagnosis: Encounter for donation of kidney [Z52.4]    Procedure : Procedure(s):  Laparoscopic Hand Assisted Living Related Kidney Donor  PAC EVALUATION       Past Medical History:   Diagnosis Date     Arthritis      Breast cancer (H)     Teatment at [place], [place]     CTS (carpal tunnel syndrome)      Heart murmur      Lactose intolerance      Ovarian cyst     benign (per pt)      Past Surgical History:   Procedure Laterality Date     LUMPECTOMY BREAST       OOPHORECTOMY       OVARIAN CYST REMOVAL        No Known Allergies   Social History     Tobacco Use     Smoking status: Never Smoker     Smokeless tobacco: Never Used   Substance Use Topics     Alcohol use: Yes     Comment: 2 drinks a week      Wt Readings from Last 1 Encounters:   21 68 kg (150 lb)        Anesthesia Evaluation   Pt has had prior anesthetic. Type: General.    History of anesthetic complications   patient reports after her surgery in  she woke up very cold.    ROS/MED HX  ENT/Pulmonary:     (+) allergic rhinitis,     Neurologic:  - neg neurologic ROS  (-) no seizures, no CVA and no TIA   Cardiovascular:     (+) -----valvular problems/murmurs function murmur. Previous cardiac testing   Echo: Date: Results:    Stress Test: Date: 21 Results:    ECG Reviewed: Date: 21 Results:  Sinus bradycardia with borderline 1st degree AVB, low voltage QRS  Cath: Date: Results:   (-) murmur and wheezes   METS/Exercise Tolerance: >4 METS    Hematologic:  - neg hematologic  ROS     Musculoskeletal:   (+) arthritis (carpal tunnel syndrome),     GI/Hepatic: Comment: Lactose intolerance.  - neg GI/hepatic ROS  (-) GERD   Renal/Genitourinary:  - neg Renal ROS     Endo:  - neg endo ROS     Psychiatric/Substance Use:  - neg psychiatric ROS     Infectious Disease:  - neg infectious disease ROS     Malignancy:   (+)  Malignancy, History of Breast.Breast CA Remission status post Surgery and Radiation.        Other:  - neg other ROS          Physical Exam    Airway        Mallampati: II   TM distance: > 3 FB   Neck ROM: full   Mouth opening: > 3 cm    Respiratory Devices and Support         Dental  no notable dental history         Cardiovascular          Rhythm and rate: regular and normal (-) no systolic click and no murmur    Pulmonary   pulmonary exam normal        breath sounds clear to auscultation   (-) no wheezes        OUTSIDE LABS:  CBC:   Lab Results   Component Value Date    WBC 3.3 (L) 07/16/2021    HGB 13.6 01/24/2022    HGB 12.6 07/16/2021    HGB 12.6 07/16/2021    HCT 36.8 07/16/2021     07/16/2021     BMP:   Lab Results   Component Value Date     07/16/2021    POTASSIUM 3.6 07/16/2021    CHLORIDE 110 (H) 07/16/2021    CO2 25 07/16/2021    BUN 13 07/16/2021    CR 0.76 01/24/2022    CR 0.74 07/16/2021    CR 0.75 07/16/2021    GLC 92 07/16/2021    GLC 89 07/16/2021     COAGS:   Lab Results   Component Value Date    PTT 31 07/16/2021    INR 1.08 07/16/2021     POC: No results found for: BGM, HCG, HCGS  HEPATIC:   Lab Results   Component Value Date    ALBUMIN 3.7 07/16/2021    PROTTOTAL 7.1 07/16/2021    ALT 20 07/16/2021    AST 20 07/16/2021    ALKPHOS 61 07/16/2021    BILITOTAL 0.5 07/16/2021     OTHER:   Lab Results   Component Value Date    A1C 5.4 07/16/2021    SHERIE 9.1 07/16/2021    PHOS 3.9 07/16/2021       Anesthesia Plan    ASA Status:  2   NPO Status:  NPO Appropriate    Anesthesia Type: General.     - Airway: ETT   Induction: Intravenous.   Maintenance: Inhalation.   Techniques and Equipment:     - Lines/Monitors: 2nd IV     Consents    Anesthesia Plan(s) and associated risks, benefits, and realistic alternatives discussed. Questions answered and patient/representative(s) expressed understanding.    - Discussed:     - Discussed with:  Patient      - Extended Intubation/Ventilatory Support  Discussed: Yes.      - Patient is DNR/DNI Status: No    Use of blood products discussed: Yes.     - Discussed with: Patient.     Postoperative Care    Pain management: IV analgesics.   PONV prophylaxis: Ondansetron (or other 5HT-3), Dexamethasone or Solumedrol     Comments:                Tiki Coats PA-C

## 2022-01-24 NOTE — LETTER
Date:January 28, 2022      Patient was self referred, no letter generated. Do not send.        [facility] Health Information

## 2022-01-25 ENCOUNTER — LAB (OUTPATIENT)
Dept: LAB | Facility: CLINIC | Age: 62
End: 2022-01-25

## 2022-01-25 DIAGNOSIS — Z52.4 ENCOUNTER FOR DONATION OF KIDNEY: ICD-10-CM

## 2022-01-25 LAB — SARS-COV-2 RNA RESP QL NAA+PROBE: NEGATIVE

## 2022-01-25 PROCEDURE — 99000 SPECIMEN HANDLING OFFICE-LAB: CPT | Performed by: PATHOLOGY

## 2022-01-25 PROCEDURE — U0005 INFEC AGEN DETEC AMPLI PROBE: HCPCS | Mod: 90 | Performed by: PATHOLOGY

## 2022-01-25 PROCEDURE — U0003 INFECTIOUS AGENT DETECTION BY NUCLEIC ACID (DNA OR RNA); SEVERE ACUTE RESPIRATORY SYNDROME CORONAVIRUS 2 (SARS-COV-2) (CORONAVIRUS DISEASE [COVID-19]), AMPLIFIED PROBE TECHNIQUE, MAKING USE OF HIGH THROUGHPUT TECHNOLOGIES AS DESCRIBED BY CMS-2020-01-R: HCPCS | Mod: 90 | Performed by: PATHOLOGY

## 2022-01-27 ENCOUNTER — HOSPITAL ENCOUNTER (INPATIENT)
Facility: CLINIC | Age: 62
LOS: 3 days | Discharge: HOME | DRG: 660 | End: 2022-01-30
Attending: TRANSPLANT SURGERY | Admitting: SURGERY

## 2022-01-27 ENCOUNTER — ANESTHESIA (OUTPATIENT)
Dept: SURGERY | Facility: CLINIC | Age: 62
DRG: 660 | End: 2022-01-27

## 2022-01-27 DIAGNOSIS — Z52.4 ENCOUNTER FOR DONATION OF KIDNEY: ICD-10-CM

## 2022-01-27 DIAGNOSIS — G89.18 ACUTE POST-OPERATIVE PAIN: ICD-10-CM

## 2022-01-27 DIAGNOSIS — Z00.5 TRANSPLANT DONOR EVALUATION: Primary | ICD-10-CM

## 2022-01-27 LAB
ANION GAP SERPL CALCULATED.3IONS-SCNC: 7 MMOL/L (ref 3–14)
BLD PROD TYP BPU: NORMAL
BLOOD COMPONENT TYPE: NORMAL
BUN SERPL-MCNC: 12 MG/DL (ref 7–30)
CALCIUM SERPL-MCNC: 8.8 MG/DL (ref 8.5–10.1)
CHLORIDE BLD-SCNC: 104 MMOL/L (ref 94–109)
CK SERPL-CCNC: 303 U/L (ref 30–225)
CO2 SERPL-SCNC: 26 MMOL/L (ref 20–32)
CODING SYSTEM: NORMAL
CREAT SERPL-MCNC: 1.12 MG/DL (ref 0.52–1.04)
CROSSMATCH: NORMAL
ERYTHROCYTE [DISTWIDTH] IN BLOOD BY AUTOMATED COUNT: 13.2 % (ref 10–15)
ERYTHROCYTE [DISTWIDTH] IN BLOOD BY AUTOMATED COUNT: 13.3 % (ref 10–15)
GFR SERPL CREATININE-BSD FRML MDRD: 55 ML/MIN/1.73M2
GLUCOSE BLD-MCNC: 158 MG/DL (ref 70–99)
GLUCOSE BLDC GLUCOMTR-MCNC: 166 MG/DL (ref 70–99)
GLUCOSE BLDC GLUCOMTR-MCNC: 193 MG/DL (ref 70–99)
GLUCOSE BLDC GLUCOMTR-MCNC: 49 MG/DL (ref 70–99)
GLUCOSE BLDC GLUCOMTR-MCNC: 54 MG/DL (ref 70–99)
HCT VFR BLD AUTO: 34 % (ref 35–47)
HCT VFR BLD AUTO: 36.1 % (ref 35–47)
HGB BLD-MCNC: 11.3 G/DL (ref 11.7–15.7)
HGB BLD-MCNC: 12 G/DL (ref 11.7–15.7)
HOLD SPECIMEN: NORMAL
ISSUE DATE AND TIME: NORMAL
MCH RBC QN AUTO: 31.6 PG (ref 26.5–33)
MCH RBC QN AUTO: 31.6 PG (ref 26.5–33)
MCHC RBC AUTO-ENTMCNC: 33.2 G/DL (ref 31.5–36.5)
MCHC RBC AUTO-ENTMCNC: 33.2 G/DL (ref 31.5–36.5)
MCV RBC AUTO: 95 FL (ref 78–100)
MCV RBC AUTO: 95 FL (ref 78–100)
PLATELET # BLD AUTO: 157 10E3/UL (ref 150–450)
PLATELET # BLD AUTO: 181 10E3/UL (ref 150–450)
POTASSIUM BLD-SCNC: 4.1 MMOL/L (ref 3.4–5.3)
RBC # BLD AUTO: 3.58 10E6/UL (ref 3.8–5.2)
RBC # BLD AUTO: 3.8 10E6/UL (ref 3.8–5.2)
SODIUM SERPL-SCNC: 137 MMOL/L (ref 133–144)
UNIT ABO/RH: NORMAL
UNIT NUMBER: NORMAL
UNIT STATUS: NORMAL
UNIT TYPE ISBT: 7300
WBC # BLD AUTO: 12.2 10E3/UL (ref 4–11)
WBC # BLD AUTO: 8.4 10E3/UL (ref 4–11)

## 2022-01-27 PROCEDURE — 710N000010 HC RECOVERY PHASE 1, LEVEL 2, PER MIN: Performed by: TRANSPLANT SURGERY

## 2022-01-27 PROCEDURE — 85027 COMPLETE CBC AUTOMATED: CPT | Performed by: NURSE PRACTITIONER

## 2022-01-27 PROCEDURE — C9290 INJ, BUPIVACAINE LIPOSOME: HCPCS | Performed by: STUDENT IN AN ORGANIZED HEALTH CARE EDUCATION/TRAINING PROGRAM

## 2022-01-27 PROCEDURE — 88305 TISSUE EXAM BY PATHOLOGIST: CPT | Mod: TC | Performed by: TRANSPLANT SURGERY

## 2022-01-27 PROCEDURE — 272N000001 HC OR GENERAL SUPPLY STERILE: Performed by: TRANSPLANT SURGERY

## 2022-01-27 PROCEDURE — 258N000003 HC RX IP 258 OP 636: Performed by: REGISTERED NURSE

## 2022-01-27 PROCEDURE — 250N000009 HC RX 250: Performed by: TRANSPLANT SURGERY

## 2022-01-27 PROCEDURE — 07TP4ZZ RESECTION OF SPLEEN, PERCUTANEOUS ENDOSCOPIC APPROACH: ICD-10-PCS | Performed by: SURGERY

## 2022-01-27 PROCEDURE — 250N000011 HC RX IP 250 OP 636: Performed by: STUDENT IN AN ORGANIZED HEALTH CARE EDUCATION/TRAINING PROGRAM

## 2022-01-27 PROCEDURE — 38120 LAPAROSCOPY SPLENECTOMY: CPT | Mod: 59 | Performed by: SURGERY

## 2022-01-27 PROCEDURE — 85027 COMPLETE CBC AUTOMATED: CPT | Performed by: STUDENT IN AN ORGANIZED HEALTH CARE EDUCATION/TRAINING PROGRAM

## 2022-01-27 PROCEDURE — 36415 COLL VENOUS BLD VENIPUNCTURE: CPT | Performed by: STUDENT IN AN ORGANIZED HEALTH CARE EDUCATION/TRAINING PROGRAM

## 2022-01-27 PROCEDURE — 250N000011 HC RX IP 250 OP 636: Performed by: NURSE PRACTITIONER

## 2022-01-27 PROCEDURE — 36415 COLL VENOUS BLD VENIPUNCTURE: CPT | Performed by: NURSE PRACTITIONER

## 2022-01-27 PROCEDURE — 250N000009 HC RX 250: Performed by: ANESTHESIOLOGY

## 2022-01-27 PROCEDURE — 258N000001 HC RX 258: Performed by: ANESTHESIOLOGY

## 2022-01-27 PROCEDURE — 258N000003 HC RX IP 258 OP 636: Performed by: NURSE PRACTITIONER

## 2022-01-27 PROCEDURE — 999N000141 HC STATISTIC PRE-PROCEDURE NURSING ASSESSMENT: Performed by: TRANSPLANT SURGERY

## 2022-01-27 PROCEDURE — 82310 ASSAY OF CALCIUM: CPT | Performed by: STUDENT IN AN ORGANIZED HEALTH CARE EDUCATION/TRAINING PROGRAM

## 2022-01-27 PROCEDURE — 250N000011 HC RX IP 250 OP 636: Performed by: ANESTHESIOLOGY

## 2022-01-27 PROCEDURE — 82550 ASSAY OF CK (CPK): CPT | Performed by: NURSE PRACTITIONER

## 2022-01-27 PROCEDURE — 0TT10ZZ RESECTION OF LEFT KIDNEY, OPEN APPROACH: ICD-10-PCS | Performed by: SURGERY

## 2022-01-27 PROCEDURE — 120N000011 HC R&B TRANSPLANT UMMC

## 2022-01-27 PROCEDURE — 250N000011 HC RX IP 250 OP 636: Performed by: REGISTERED NURSE

## 2022-01-27 PROCEDURE — 250N000025 HC SEVOFLURANE, PER MIN: Performed by: TRANSPLANT SURGERY

## 2022-01-27 PROCEDURE — 250N000013 HC RX MED GY IP 250 OP 250 PS 637: Performed by: STUDENT IN AN ORGANIZED HEALTH CARE EDUCATION/TRAINING PROGRAM

## 2022-01-27 PROCEDURE — 50547 LAPARO REMOVAL DONOR KIDNEY: CPT | Performed by: SURGERY

## 2022-01-27 PROCEDURE — 88240 CELL CRYOPRESERVE/STORAGE: CPT | Performed by: NURSE PRACTITIONER

## 2022-01-27 PROCEDURE — 360N000077 HC SURGERY LEVEL 4, PER MIN: Performed by: TRANSPLANT SURGERY

## 2022-01-27 PROCEDURE — 370N000017 HC ANESTHESIA TECHNICAL FEE, PER MIN: Performed by: TRANSPLANT SURGERY

## 2022-01-27 PROCEDURE — 250N000013 HC RX MED GY IP 250 OP 250 PS 637: Performed by: NURSE PRACTITIONER

## 2022-01-27 PROCEDURE — 250N000009 HC RX 250: Performed by: REGISTERED NURSE

## 2022-01-27 PROCEDURE — 88305 TISSUE EXAM BY PATHOLOGIST: CPT | Mod: 26 | Performed by: PATHOLOGY

## 2022-01-27 PROCEDURE — 250N000013 HC RX MED GY IP 250 OP 250 PS 637: Performed by: ANESTHESIOLOGY

## 2022-01-27 RX ORDER — DEXTROSE, SODIUM CHLORIDE, SODIUM LACTATE, POTASSIUM CHLORIDE, AND CALCIUM CHLORIDE 5; .6; .31; .03; .02 G/100ML; G/100ML; G/100ML; G/100ML; G/100ML
INJECTION, SOLUTION INTRAVENOUS CONTINUOUS
Status: DISCONTINUED | OUTPATIENT
Start: 2022-01-27 | End: 2022-01-30 | Stop reason: HOSPADM

## 2022-01-27 RX ORDER — SODIUM CHLORIDE, SODIUM LACTATE, POTASSIUM CHLORIDE, CALCIUM CHLORIDE 600; 310; 30; 20 MG/100ML; MG/100ML; MG/100ML; MG/100ML
INJECTION, SOLUTION INTRAVENOUS CONTINUOUS
Status: DISCONTINUED | OUTPATIENT
Start: 2022-01-27 | End: 2022-01-27 | Stop reason: HOSPADM

## 2022-01-27 RX ORDER — EPHEDRINE SULFATE 50 MG/ML
INJECTION, SOLUTION INTRAMUSCULAR; INTRAVENOUS; SUBCUTANEOUS PRN
Status: DISCONTINUED | OUTPATIENT
Start: 2022-01-27 | End: 2022-01-27

## 2022-01-27 RX ORDER — BISACODYL 10 MG
10 SUPPOSITORY, RECTAL RECTAL DAILY
Status: DISCONTINUED | OUTPATIENT
Start: 2022-01-28 | End: 2022-01-30 | Stop reason: HOSPADM

## 2022-01-27 RX ORDER — FENTANYL CITRATE 50 UG/ML
25-50 INJECTION, SOLUTION INTRAMUSCULAR; INTRAVENOUS
Status: DISCONTINUED | OUTPATIENT
Start: 2022-01-27 | End: 2022-01-27 | Stop reason: HOSPADM

## 2022-01-27 RX ORDER — ONDANSETRON 2 MG/ML
4 INJECTION INTRAMUSCULAR; INTRAVENOUS ONCE
Status: DISCONTINUED | OUTPATIENT
Start: 2022-01-27 | End: 2022-01-27 | Stop reason: HOSPADM

## 2022-01-27 RX ORDER — OXYCODONE HYDROCHLORIDE 5 MG/1
5 TABLET ORAL EVERY 4 HOURS PRN
Status: DISCONTINUED | OUTPATIENT
Start: 2022-01-27 | End: 2022-01-27 | Stop reason: HOSPADM

## 2022-01-27 RX ORDER — ONDANSETRON 4 MG/1
4 TABLET, ORALLY DISINTEGRATING ORAL EVERY 30 MIN PRN
Status: DISCONTINUED | OUTPATIENT
Start: 2022-01-27 | End: 2022-01-27 | Stop reason: HOSPADM

## 2022-01-27 RX ORDER — PROCHLORPERAZINE MALEATE 5 MG
10 TABLET ORAL EVERY 6 HOURS PRN
Status: DISCONTINUED | OUTPATIENT
Start: 2022-01-27 | End: 2022-01-30 | Stop reason: HOSPADM

## 2022-01-27 RX ORDER — FENTANYL CITRATE 50 UG/ML
25 INJECTION, SOLUTION INTRAMUSCULAR; INTRAVENOUS
Status: DISCONTINUED | OUTPATIENT
Start: 2022-01-27 | End: 2022-01-27 | Stop reason: HOSPADM

## 2022-01-27 RX ORDER — FENTANYL CITRATE 50 UG/ML
10-20 INJECTION, SOLUTION INTRAMUSCULAR; INTRAVENOUS
Status: DISCONTINUED | OUTPATIENT
Start: 2022-01-27 | End: 2022-01-29

## 2022-01-27 RX ORDER — ACETAMINOPHEN 325 MG/1
650 TABLET ORAL EVERY 6 HOURS
Status: COMPLETED | OUTPATIENT
Start: 2022-01-27 | End: 2022-01-29

## 2022-01-27 RX ORDER — SODIUM CHLORIDE, SODIUM LACTATE, POTASSIUM CHLORIDE, CALCIUM CHLORIDE 600; 310; 30; 20 MG/100ML; MG/100ML; MG/100ML; MG/100ML
INJECTION, SOLUTION INTRAVENOUS CONTINUOUS PRN
Status: DISCONTINUED | OUTPATIENT
Start: 2022-01-27 | End: 2022-01-27

## 2022-01-27 RX ORDER — NALOXONE HYDROCHLORIDE 0.4 MG/ML
0.4 INJECTION, SOLUTION INTRAMUSCULAR; INTRAVENOUS; SUBCUTANEOUS
Status: DISCONTINUED | OUTPATIENT
Start: 2022-01-27 | End: 2022-01-27 | Stop reason: HOSPADM

## 2022-01-27 RX ORDER — LIDOCAINE HYDROCHLORIDE 20 MG/ML
INJECTION, SOLUTION INFILTRATION; PERINEURAL PRN
Status: DISCONTINUED | OUTPATIENT
Start: 2022-01-27 | End: 2022-01-27

## 2022-01-27 RX ORDER — PROCHLORPERAZINE MALEATE 10 MG
10 TABLET ORAL EVERY 6 HOURS PRN
Status: DISCONTINUED | OUTPATIENT
Start: 2022-01-27 | End: 2022-01-27 | Stop reason: HOSPADM

## 2022-01-27 RX ORDER — ONDANSETRON 2 MG/ML
4 INJECTION INTRAMUSCULAR; INTRAVENOUS EVERY 30 MIN PRN
Status: DISCONTINUED | OUTPATIENT
Start: 2022-01-27 | End: 2022-01-27 | Stop reason: HOSPADM

## 2022-01-27 RX ORDER — KETOROLAC TROMETHAMINE 15 MG/ML
10 INJECTION, SOLUTION INTRAMUSCULAR; INTRAVENOUS EVERY 8 HOURS
Status: COMPLETED | OUTPATIENT
Start: 2022-01-27 | End: 2022-01-29

## 2022-01-27 RX ORDER — AMOXICILLIN 250 MG
2 CAPSULE ORAL 2 TIMES DAILY
Status: DISCONTINUED | OUTPATIENT
Start: 2022-01-27 | End: 2022-01-30 | Stop reason: HOSPADM

## 2022-01-27 RX ORDER — ONDANSETRON 4 MG/1
4 TABLET, ORALLY DISINTEGRATING ORAL EVERY 6 HOURS PRN
Status: DISCONTINUED | OUTPATIENT
Start: 2022-01-27 | End: 2022-01-27 | Stop reason: HOSPADM

## 2022-01-27 RX ORDER — ONDANSETRON 2 MG/ML
INJECTION INTRAMUSCULAR; INTRAVENOUS PRN
Status: DISCONTINUED | OUTPATIENT
Start: 2022-01-27 | End: 2022-01-27

## 2022-01-27 RX ORDER — MAGNESIUM SULFATE HEPTAHYDRATE 40 MG/ML
2 INJECTION, SOLUTION INTRAVENOUS ONCE
Status: COMPLETED | OUTPATIENT
Start: 2022-01-27 | End: 2022-01-27

## 2022-01-27 RX ORDER — LIDOCAINE 40 MG/G
CREAM TOPICAL
Status: DISCONTINUED | OUTPATIENT
Start: 2022-01-27 | End: 2022-01-27 | Stop reason: HOSPADM

## 2022-01-27 RX ORDER — ACETAMINOPHEN 325 MG/1
975 TABLET ORAL ONCE
Status: COMPLETED | OUTPATIENT
Start: 2022-01-27 | End: 2022-01-27

## 2022-01-27 RX ORDER — ONDANSETRON 4 MG/1
4 TABLET, ORALLY DISINTEGRATING ORAL EVERY 6 HOURS PRN
Status: DISCONTINUED | OUTPATIENT
Start: 2022-01-27 | End: 2022-01-30 | Stop reason: HOSPADM

## 2022-01-27 RX ORDER — NALOXONE HYDROCHLORIDE 0.4 MG/ML
0.4 INJECTION, SOLUTION INTRAMUSCULAR; INTRAVENOUS; SUBCUTANEOUS
Status: DISCONTINUED | OUTPATIENT
Start: 2022-01-27 | End: 2022-01-30 | Stop reason: HOSPADM

## 2022-01-27 RX ORDER — GLYCOPYRROLATE 0.2 MG/ML
INJECTION, SOLUTION INTRAMUSCULAR; INTRAVENOUS PRN
Status: DISCONTINUED | OUTPATIENT
Start: 2022-01-27 | End: 2022-01-27

## 2022-01-27 RX ORDER — HEPARIN SODIUM 1000 [USP'U]/ML
70 INJECTION, SOLUTION INTRAVENOUS; SUBCUTANEOUS ONCE
Status: DISCONTINUED | OUTPATIENT
Start: 2022-01-27 | End: 2022-01-27 | Stop reason: HOSPADM

## 2022-01-27 RX ORDER — FENTANYL CITRATE 50 UG/ML
25 INJECTION, SOLUTION INTRAMUSCULAR; INTRAVENOUS EVERY 5 MIN PRN
Status: DISCONTINUED | OUTPATIENT
Start: 2022-01-27 | End: 2022-01-27 | Stop reason: HOSPADM

## 2022-01-27 RX ORDER — DEXTROSE MONOHYDRATE 25 G/50ML
25-50 INJECTION, SOLUTION INTRAVENOUS
Status: DISCONTINUED | OUTPATIENT
Start: 2022-01-27 | End: 2022-01-27

## 2022-01-27 RX ORDER — MANNITOL 20 G/100ML
INJECTION, SOLUTION INTRAVENOUS PRN
Status: DISCONTINUED | OUTPATIENT
Start: 2022-01-27 | End: 2022-01-27

## 2022-01-27 RX ORDER — CEFUROXIME SODIUM 1.5 G/16ML
1.5 INJECTION, POWDER, FOR SOLUTION INTRAVENOUS
Status: DISCONTINUED | OUTPATIENT
Start: 2022-01-27 | End: 2022-01-27 | Stop reason: HOSPADM

## 2022-01-27 RX ORDER — NICOTINE POLACRILEX 4 MG
15-30 LOZENGE BUCCAL
Status: DISCONTINUED | OUTPATIENT
Start: 2022-01-27 | End: 2022-01-27

## 2022-01-27 RX ORDER — NALOXONE HYDROCHLORIDE 0.4 MG/ML
0.2 INJECTION, SOLUTION INTRAMUSCULAR; INTRAVENOUS; SUBCUTANEOUS
Status: DISCONTINUED | OUTPATIENT
Start: 2022-01-27 | End: 2022-01-30 | Stop reason: HOSPADM

## 2022-01-27 RX ORDER — METHOCARBAMOL 500 MG/1
500 TABLET, FILM COATED ORAL 4 TIMES DAILY
Status: DISCONTINUED | OUTPATIENT
Start: 2022-01-27 | End: 2022-01-30 | Stop reason: HOSPADM

## 2022-01-27 RX ORDER — FUROSEMIDE 10 MG/ML
INJECTION INTRAMUSCULAR; INTRAVENOUS PRN
Status: DISCONTINUED | OUTPATIENT
Start: 2022-01-27 | End: 2022-01-27

## 2022-01-27 RX ORDER — MEPERIDINE HYDROCHLORIDE 25 MG/ML
12.5 INJECTION INTRAMUSCULAR; INTRAVENOUS; SUBCUTANEOUS
Status: DISCONTINUED | OUTPATIENT
Start: 2022-01-27 | End: 2022-01-27 | Stop reason: HOSPADM

## 2022-01-27 RX ORDER — PROTAMINE SULFATE 10 MG/ML
50 INJECTION, SOLUTION INTRAVENOUS ONCE
Status: DISCONTINUED | OUTPATIENT
Start: 2022-01-27 | End: 2022-01-27 | Stop reason: HOSPADM

## 2022-01-27 RX ORDER — BUPIVACAINE HYDROCHLORIDE 2.5 MG/ML
INJECTION, SOLUTION EPIDURAL; INFILTRATION; INTRACAUDAL
Status: COMPLETED | OUTPATIENT
Start: 2022-01-27 | End: 2022-01-27

## 2022-01-27 RX ORDER — ALBUMIN, HUMAN INJ 5% 5 %
250 SOLUTION INTRAVENOUS EVERY 10 MIN PRN
Status: DISCONTINUED | OUTPATIENT
Start: 2022-01-27 | End: 2022-01-27 | Stop reason: HOSPADM

## 2022-01-27 RX ORDER — PROPOFOL 10 MG/ML
INJECTION, EMULSION INTRAVENOUS PRN
Status: DISCONTINUED | OUTPATIENT
Start: 2022-01-27 | End: 2022-01-27

## 2022-01-27 RX ORDER — ONDANSETRON 2 MG/ML
4 INJECTION INTRAMUSCULAR; INTRAVENOUS EVERY 6 HOURS PRN
Status: DISCONTINUED | OUTPATIENT
Start: 2022-01-27 | End: 2022-01-27 | Stop reason: HOSPADM

## 2022-01-27 RX ORDER — GABAPENTIN 300 MG/1
300 CAPSULE ORAL ONCE
Status: COMPLETED | OUTPATIENT
Start: 2022-01-27 | End: 2022-01-27

## 2022-01-27 RX ORDER — NALOXONE HYDROCHLORIDE 0.4 MG/ML
0.2 INJECTION, SOLUTION INTRAMUSCULAR; INTRAVENOUS; SUBCUTANEOUS
Status: DISCONTINUED | OUTPATIENT
Start: 2022-01-27 | End: 2022-01-27 | Stop reason: HOSPADM

## 2022-01-27 RX ORDER — FLUMAZENIL 0.1 MG/ML
0.2 INJECTION, SOLUTION INTRAVENOUS
Status: DISCONTINUED | OUTPATIENT
Start: 2022-01-27 | End: 2022-01-27 | Stop reason: HOSPADM

## 2022-01-27 RX ORDER — AMOXICILLIN 250 MG
1 CAPSULE ORAL 2 TIMES DAILY
Status: DISCONTINUED | OUTPATIENT
Start: 2022-01-27 | End: 2022-01-30 | Stop reason: HOSPADM

## 2022-01-27 RX ORDER — DEXAMETHASONE SODIUM PHOSPHATE 4 MG/ML
INJECTION, SOLUTION INTRA-ARTICULAR; INTRALESIONAL; INTRAMUSCULAR; INTRAVENOUS; SOFT TISSUE PRN
Status: DISCONTINUED | OUTPATIENT
Start: 2022-01-27 | End: 2022-01-27

## 2022-01-27 RX ORDER — FAMOTIDINE 20 MG/1
20 TABLET, FILM COATED ORAL DAILY
Status: DISCONTINUED | OUTPATIENT
Start: 2022-01-28 | End: 2022-01-30 | Stop reason: HOSPADM

## 2022-01-27 RX ORDER — CEFUROXIME SODIUM 1.5 G/16ML
1.5 INJECTION, POWDER, FOR SOLUTION INTRAVENOUS
Status: COMPLETED | OUTPATIENT
Start: 2022-01-27 | End: 2022-01-27

## 2022-01-27 RX ORDER — KETOROLAC TROMETHAMINE 30 MG/ML
15 INJECTION, SOLUTION INTRAMUSCULAR; INTRAVENOUS ONCE
Status: COMPLETED | OUTPATIENT
Start: 2022-01-27 | End: 2022-01-27

## 2022-01-27 RX ORDER — ONDANSETRON 2 MG/ML
4 INJECTION INTRAMUSCULAR; INTRAVENOUS EVERY 6 HOURS PRN
Status: DISCONTINUED | OUTPATIENT
Start: 2022-01-27 | End: 2022-01-30 | Stop reason: HOSPADM

## 2022-01-27 RX ORDER — FENTANYL CITRATE 50 UG/ML
INJECTION, SOLUTION INTRAMUSCULAR; INTRAVENOUS PRN
Status: DISCONTINUED | OUTPATIENT
Start: 2022-01-27 | End: 2022-01-27

## 2022-01-27 RX ORDER — CARDIOPLEG/ORGAN PRESERV NO.1 9-198-2-1
BOTTLE PERFUSION PRN
Status: DISCONTINUED | OUTPATIENT
Start: 2022-01-27 | End: 2022-01-27 | Stop reason: HOSPADM

## 2022-01-27 RX ORDER — OXYCODONE HYDROCHLORIDE 5 MG/1
5-10 TABLET ORAL
Status: DISCONTINUED | OUTPATIENT
Start: 2022-01-27 | End: 2022-01-30 | Stop reason: HOSPADM

## 2022-01-27 RX ORDER — HEPARIN SODIUM 5000 [USP'U]/.5ML
5000 INJECTION, SOLUTION INTRAVENOUS; SUBCUTANEOUS ONCE
Status: DISCONTINUED | OUTPATIENT
Start: 2022-01-27 | End: 2022-01-27

## 2022-01-27 RX ORDER — HYDROMORPHONE HYDROCHLORIDE 1 MG/ML
0.2 INJECTION, SOLUTION INTRAMUSCULAR; INTRAVENOUS; SUBCUTANEOUS EVERY 5 MIN PRN
Status: DISCONTINUED | OUTPATIENT
Start: 2022-01-27 | End: 2022-01-27 | Stop reason: HOSPADM

## 2022-01-27 RX ORDER — DEXAMETHASONE SODIUM PHOSPHATE 4 MG/ML
8 INJECTION, SOLUTION INTRA-ARTICULAR; INTRALESIONAL; INTRAMUSCULAR; INTRAVENOUS; SOFT TISSUE ONCE
Status: DISCONTINUED | OUTPATIENT
Start: 2022-01-27 | End: 2022-01-27 | Stop reason: HOSPADM

## 2022-01-27 RX ADMIN — MAGNESIUM SULFATE HEPTAHYDRATE 2 G: 40 INJECTION, SOLUTION INTRAVENOUS at 08:02

## 2022-01-27 RX ADMIN — ACETAMINOPHEN 975 MG: 325 TABLET, FILM COATED ORAL at 06:27

## 2022-01-27 RX ADMIN — FENTANYL CITRATE 50 MCG: 50 INJECTION INTRAMUSCULAR; INTRAVENOUS at 06:43

## 2022-01-27 RX ADMIN — ACETAMINOPHEN 650 MG: 325 TABLET, FILM COATED ORAL at 12:25

## 2022-01-27 RX ADMIN — ROCURONIUM BROMIDE 10 MG: 50 INJECTION, SOLUTION INTRAVENOUS at 10:24

## 2022-01-27 RX ADMIN — SODIUM CHLORIDE, POTASSIUM CHLORIDE, SODIUM LACTATE AND CALCIUM CHLORIDE: 600; 310; 30; 20 INJECTION, SOLUTION INTRAVENOUS at 07:50

## 2022-01-27 RX ADMIN — FENTANYL CITRATE 50 MCG: 50 INJECTION, SOLUTION INTRAMUSCULAR; INTRAVENOUS at 10:50

## 2022-01-27 RX ADMIN — SODIUM CHLORIDE, POTASSIUM CHLORIDE, SODIUM LACTATE AND CALCIUM CHLORIDE: 600; 310; 30; 20 INJECTION, SOLUTION INTRAVENOUS at 07:33

## 2022-01-27 RX ADMIN — FENTANYL CITRATE 100 MCG: 50 INJECTION, SOLUTION INTRAMUSCULAR; INTRAVENOUS at 07:43

## 2022-01-27 RX ADMIN — METHOCARBAMOL 500 MG: 500 TABLET ORAL at 15:15

## 2022-01-27 RX ADMIN — ACETAMINOPHEN 650 MG: 325 TABLET, FILM COATED ORAL at 18:33

## 2022-01-27 RX ADMIN — GLYCOPYRROLATE 0.2 MG: 0.2 INJECTION, SOLUTION INTRAMUSCULAR; INTRAVENOUS at 08:39

## 2022-01-27 RX ADMIN — HYDROMORPHONE HYDROCHLORIDE 0.2 MG: 1 INJECTION, SOLUTION INTRAMUSCULAR; INTRAVENOUS; SUBCUTANEOUS at 12:37

## 2022-01-27 RX ADMIN — FENTANYL CITRATE 25 MCG: 50 INJECTION, SOLUTION INTRAMUSCULAR; INTRAVENOUS at 12:30

## 2022-01-27 RX ADMIN — ROCURONIUM BROMIDE 20 MG: 50 INJECTION, SOLUTION INTRAVENOUS at 09:17

## 2022-01-27 RX ADMIN — FUROSEMIDE 5 MG: 10 INJECTION, SOLUTION INTRAVENOUS at 09:38

## 2022-01-27 RX ADMIN — MIDAZOLAM 1 MG: 1 INJECTION INTRAMUSCULAR; INTRAVENOUS at 06:43

## 2022-01-27 RX ADMIN — BUPIVACAINE HYDROCHLORIDE 20 ML: 2.5 INJECTION, SOLUTION EPIDURAL; INFILTRATION; INTRACAUDAL; PERINEURAL at 06:52

## 2022-01-27 RX ADMIN — METHOCARBAMOL 500 MG: 500 TABLET ORAL at 20:02

## 2022-01-27 RX ADMIN — DEXAMETHASONE SODIUM PHOSPHATE 8 MG: 4 INJECTION, SOLUTION INTRA-ARTICULAR; INTRALESIONAL; INTRAMUSCULAR; INTRAVENOUS; SOFT TISSUE at 08:01

## 2022-01-27 RX ADMIN — SUGAMMADEX 200 MG: 100 INJECTION, SOLUTION INTRAVENOUS at 11:16

## 2022-01-27 RX ADMIN — FENTANYL CITRATE 25 MCG: 50 INJECTION, SOLUTION INTRAMUSCULAR; INTRAVENOUS at 09:17

## 2022-01-27 RX ADMIN — OXYCODONE HYDROCHLORIDE 5 MG: 5 TABLET ORAL at 12:28

## 2022-01-27 RX ADMIN — ROCURONIUM BROMIDE 50 MG: 50 INJECTION, SOLUTION INTRAVENOUS at 07:44

## 2022-01-27 RX ADMIN — OXYCODONE HYDROCHLORIDE 5 MG: 5 TABLET ORAL at 18:33

## 2022-01-27 RX ADMIN — SODIUM CHLORIDE, SODIUM LACTATE, POTASSIUM CHLORIDE, CALCIUM CHLORIDE AND DEXTROSE MONOHYDRATE: 5; 600; 310; 30; 20 INJECTION, SOLUTION INTRAVENOUS at 12:35

## 2022-01-27 RX ADMIN — FENTANYL CITRATE 25 MCG: 50 INJECTION, SOLUTION INTRAMUSCULAR; INTRAVENOUS at 09:10

## 2022-01-27 RX ADMIN — DEXTROSE MONOHYDRATE 25 ML: 500 INJECTION PARENTERAL at 06:21

## 2022-01-27 RX ADMIN — Medication 10 MG: at 08:40

## 2022-01-27 RX ADMIN — HYDROMORPHONE HYDROCHLORIDE 0.2 MG: 1 INJECTION, SOLUTION INTRAMUSCULAR; INTRAVENOUS; SUBCUTANEOUS at 11:55

## 2022-01-27 RX ADMIN — ONDANSETRON 4 MG: 2 INJECTION INTRAMUSCULAR; INTRAVENOUS at 10:43

## 2022-01-27 RX ADMIN — PROPOFOL 180 MG: 10 INJECTION, EMULSION INTRAVENOUS at 07:43

## 2022-01-27 RX ADMIN — LIDOCAINE HYDROCHLORIDE 100 MG: 20 INJECTION, SOLUTION INFILTRATION; PERINEURAL at 07:43

## 2022-01-27 RX ADMIN — FENTANYL CITRATE 50 MCG: 50 INJECTION, SOLUTION INTRAMUSCULAR; INTRAVENOUS at 08:42

## 2022-01-27 RX ADMIN — MANNITOL 12 G: 20 INJECTION, SOLUTION INTRAVENOUS at 09:39

## 2022-01-27 RX ADMIN — HYDROMORPHONE HYDROCHLORIDE 0.2 MG: 1 INJECTION, SOLUTION INTRAMUSCULAR; INTRAVENOUS; SUBCUTANEOUS at 12:07

## 2022-01-27 RX ADMIN — KETOROLAC TROMETHAMINE 10 MG: 30 INJECTION, SOLUTION INTRAMUSCULAR at 11:00

## 2022-01-27 RX ADMIN — GABAPENTIN 300 MG: 300 CAPSULE ORAL at 06:27

## 2022-01-27 RX ADMIN — SODIUM CHLORIDE, POTASSIUM CHLORIDE, SODIUM LACTATE AND CALCIUM CHLORIDE: 600; 310; 30; 20 INJECTION, SOLUTION INTRAVENOUS at 08:46

## 2022-01-27 RX ADMIN — BUPIVACAINE 20 ML: 13.3 INJECTION, SUSPENSION, LIPOSOMAL INFILTRATION at 06:52

## 2022-01-27 RX ADMIN — ROCURONIUM BROMIDE 20 MG: 50 INJECTION, SOLUTION INTRAVENOUS at 08:42

## 2022-01-27 RX ADMIN — KETOROLAC TROMETHAMINE 10 MG: 15 INJECTION, SOLUTION INTRAMUSCULAR; INTRAVENOUS at 20:44

## 2022-01-27 RX ADMIN — SENNOSIDES AND DOCUSATE SODIUM 2 TABLET: 50; 8.6 TABLET ORAL at 20:02

## 2022-01-27 RX ADMIN — CEFUROXIME 1.5 G: 1.5 INJECTION, POWDER, FOR SOLUTION INTRAVENOUS at 07:39

## 2022-01-27 ASSESSMENT — ACTIVITIES OF DAILY LIVING (ADL)
ADLS_ACUITY_SCORE: 4
ADLS_ACUITY_SCORE: 6
ADLS_ACUITY_SCORE: 4
ADLS_ACUITY_SCORE: 6
ADLS_ACUITY_SCORE: 4
ADLS_ACUITY_SCORE: 6
ADLS_ACUITY_SCORE: 4

## 2022-01-27 ASSESSMENT — MIFFLIN-ST. JEOR: SCORE: 1183.25

## 2022-01-27 NOTE — ANESTHESIA PROCEDURE NOTES
Airway       Patient location during procedure: OR       Procedure Start/Stop Times: 1/27/2022 7:46 AM  Staff -        CRNA: Bill Rivera APRN CRNA       Performed By: CRNA  Consent for Airway        Urgency: elective  Indications and Patient Condition       Indications for airway management: lorrie-procedural        Final Airway Details       Final airway type: endotracheal airway       Successful airway: ETT - single  Endotracheal Airway Details        ETT size (mm): 7.5       Cuffed: yes       Successful intubation technique: direct laryngoscopy       DL Blade Type: Randolph 2       Grade View of Cords: 2       Adjucts: stylet       Position: Right       Measured from: gums/teeth       Secured at (cm): 21       Bite block used: None    Post intubation assessment        Number of attempts at approach: 1       Number of other approaches attempted: 0       Secured with: pink tape       Ease of procedure: easy       Dentition: Intact and Unchanged

## 2022-01-27 NOTE — PROVIDER NOTIFICATION
1440 Md TAN Paged to update on increasing  pain /muscle cramping, asked for medications as she is not due for anything else at this time.   1500 consulted with anesthesia, see MAR and orders for medication.

## 2022-01-27 NOTE — ANESTHESIA PROCEDURE NOTES
TAP Procedure Note    Pre-Procedure   Staff -        Anesthesiologist:  Dustin Gibson MD       Resident/Fellow: Melina Malik MD       Performed By: resident       Location: pre-op       Pre-Anesthestic Checklist: patient identified, IV checked, site marked, risks and benefits discussed, informed consent, monitors and equipment checked, pre-op evaluation, at physician/surgeon's request and post-op pain management  Timeout:       Correct Patient: Yes        Correct Procedure: Yes        Correct Site: Yes        Correct Position: Yes        Correct Laterality: Yes        Site Marked: Yes  Procedure Documentation  Procedure: TAP       Diagnosis: POST OPERATIVE PAIN       Laterality: bilateral       Patient Position: supine       Patient Prep/Sterile Barriers: sterile gloves, mask       Skin prep: Chloraprep       Needle Type: short bevel       Needle Gauge: 21.        Needle Length (millimeters): 110        Ultrasound guided       1. Ultrasound was used to identify targeted nerve, plexus, vascular marker, or fascial plane and place a needle adjacent to it in real-time.       2. Ultrasound was used to visualize the spread of anesthetic in close proximity to the above referenced structure.       3. A permanent image is entered into the patient's record.    Assessment/Narrative         The placement was negative for: blood aspirated, painful injection and site bleeding       Paresthesias: No.     Bolus given via needle..        Secured via.        Insertion/Infusion Method: Single Shot       Complications: none       Injection made incrementally with aspirations every 5 mL.    Medication(s) Administered   Bupivacaine 0.25% PF (Infiltration), 20 mL  Bupivacaine liposome (Exparel) 1.3% LA inj susp (Infiltration), 20 mL

## 2022-01-27 NOTE — ANESTHESIA CARE TRANSFER NOTE
Patient: Lucero Herrera    Procedure: Procedure(s):  Laparoscopic Hand Assisted Living Related Kidney Donor  Laparoscopic splenectomy       Diagnosis: Encounter for donation of kidney [Z52.4]  Diagnosis Additional Information: No value filed.    Anesthesia Type:   General     Note:    Oropharynx: oropharynx clear of all foreign objects and spontaneously breathing  Level of Consciousness: awake  Oxygen Supplementation: face mask  Level of Supplemental Oxygen (L/min / FiO2): 6  Independent Airway: airway patency satisfactory and stable  Dentition: dentition unchanged  Vital Signs Stable: post-procedure vital signs reviewed and stable  Report to RN Given: handoff report given  Patient transferred to: PACU    Handoff Report: Identifed the Patient, Identified the Reponsible Provider, Reviewed the pertinent medical history, Discussed the surgical course, Reviewed Intra-OP anesthesia mangement and issues during anesthesia, Set expectations for post-procedure period and Allowed opportunity for questions and acknowledgement of understanding      Vitals:  Vitals Value Taken Time   /77 01/27/22 1150   Temp 97.7    Pulse 53 01/27/22 1153   Resp 12    SpO2 100 % 01/27/22 1153   Vitals shown include unvalidated device data.    Electronically Signed By: Vibha Stapleton  January 27, 2022  11:54 AM

## 2022-01-27 NOTE — BRIEF OP NOTE
[facility]    Brief Operative Note    Pre-operative diagnosis: Encounter for donation of kidney [Z52.4]  Post-operative diagnosis Same as pre-operative diagnosis    Procedure: Procedure(s):  Laparoscopic Hand Assisted Living Related Kidney Donor  Laparoscopic splenectomy  Surgeon: Surgeon(s) and Role:     * Ian Faust MD - Primary     * Lio Hamm MD - Assisting     * Ruperto Barragan MD - Fellow - Assisting  Anesthesia: Combined General with Block   Estimated Blood Loss: 100 mL from 1/27/2022  7:32 AM to 1/27/2022 11:40 AM      Drains: 1 drain   Specimens:   ID Type Source Tests Collected by Time Destination   1 : Spleen Tissue Spleen SURGICAL PATHOLOGY EXAM Ian Faust MD 1/27/2022 10:32 AM      Findings:   splenic bleeding requiring splenectomy; single artery, two veins, single ureter   Implants: * No implants in log *

## 2022-01-27 NOTE — ANESTHESIA POSTPROCEDURE EVALUATION
Patient: Lucero Herrera    Procedure: Procedure(s):  Laparoscopic Hand Assisted Living Related Kidney Donor  Laparoscopic splenectomy       Diagnosis:Encounter for donation of kidney [Z52.4]  Diagnosis Additional Information: No value filed.    Anesthesia Type:  General    Note:  Disposition: Outpatient   Postop Pain Control: Uneventful            Sign Out: Well controlled pain   PONV: No   Neuro/Psych: Uneventful            Sign Out: Acceptable/Baseline neuro status   Airway/Respiratory: Uneventful            Sign Out: Acceptable/Baseline resp. status   CV/Hemodynamics: Uneventful            Sign Out: Acceptable CV status   Other NRE: NONE   DID A NON-ROUTINE EVENT OCCUR? No           Last vitals:  Vitals Value Taken Time   /76 01/27/22 1200   Temp 36.5  C (97.7  F) 01/27/22 1145   Pulse 49 01/27/22 1211   Resp 12 01/27/22 1145   SpO2 100 % 01/27/22 1211   Vitals shown include unvalidated device data.    Electronically Signed By: Christopher J. Behrens, MD  January 27, 2022  12:12 PM

## 2022-01-28 PROBLEM — G89.18 ACUTE POST-OPERATIVE PAIN: Status: ACTIVE | Noted: 2022-01-28

## 2022-01-28 PROBLEM — Z90.81 S/P SPLENECTOMY: Status: ACTIVE | Noted: 2022-01-28

## 2022-01-28 LAB
ALBUMIN UR-MCNC: NEGATIVE MG/DL
APPEARANCE UR: CLEAR
BILIRUB UR QL STRIP: NEGATIVE
CK SERPL-CCNC: 372 U/L (ref 30–225)
COLOR UR AUTO: NORMAL
GLUCOSE UR STRIP-MCNC: NEGATIVE MG/DL
HGB UR QL STRIP: NEGATIVE
HOLD SPECIMEN: NORMAL
KETONES UR STRIP-MCNC: NEGATIVE MG/DL
LEUKOCYTE ESTERASE UR QL STRIP: NEGATIVE
LIPASE FLD-CCNC: 4576 U/L
LIPASE SERPL-CCNC: 296 U/L (ref 73–393)
NITRATE UR QL: NEGATIVE
PH UR STRIP: 6 [PH] (ref 5–7)
RBC URINE: 0 /HPF
SP GR UR STRIP: 1 (ref 1–1.03)
UROBILINOGEN UR STRIP-MCNC: NORMAL MG/DL
WBC URINE: <1 /HPF

## 2022-01-28 PROCEDURE — 250N000013 HC RX MED GY IP 250 OP 250 PS 637

## 2022-01-28 PROCEDURE — 250N000013 HC RX MED GY IP 250 OP 250 PS 637: Performed by: STUDENT IN AN ORGANIZED HEALTH CARE EDUCATION/TRAINING PROGRAM

## 2022-01-28 PROCEDURE — 81001 URINALYSIS AUTO W/SCOPE: CPT | Performed by: STUDENT IN AN ORGANIZED HEALTH CARE EDUCATION/TRAINING PROGRAM

## 2022-01-28 PROCEDURE — 83690 ASSAY OF LIPASE: CPT | Performed by: PHYSICIAN ASSISTANT

## 2022-01-28 PROCEDURE — 250N000011 HC RX IP 250 OP 636: Performed by: STUDENT IN AN ORGANIZED HEALTH CARE EDUCATION/TRAINING PROGRAM

## 2022-01-28 PROCEDURE — 82550 ASSAY OF CK (CPK): CPT | Performed by: STUDENT IN AN ORGANIZED HEALTH CARE EDUCATION/TRAINING PROGRAM

## 2022-01-28 PROCEDURE — 36415 COLL VENOUS BLD VENIPUNCTURE: CPT | Performed by: STUDENT IN AN ORGANIZED HEALTH CARE EDUCATION/TRAINING PROGRAM

## 2022-01-28 PROCEDURE — 120N000011 HC R&B TRANSPLANT UMMC

## 2022-01-28 RX ORDER — SIMETHICONE 80 MG
80 TABLET,CHEWABLE ORAL EVERY 6 HOURS PRN
Status: DISCONTINUED | OUTPATIENT
Start: 2022-01-28 | End: 2022-01-30 | Stop reason: HOSPADM

## 2022-01-28 RX ADMIN — OXYCODONE HYDROCHLORIDE 5 MG: 5 TABLET ORAL at 00:25

## 2022-01-28 RX ADMIN — ACETAMINOPHEN 650 MG: 325 TABLET, FILM COATED ORAL at 05:54

## 2022-01-28 RX ADMIN — KETOROLAC TROMETHAMINE 10 MG: 15 INJECTION, SOLUTION INTRAMUSCULAR; INTRAVENOUS at 20:08

## 2022-01-28 RX ADMIN — ACETAMINOPHEN 650 MG: 325 TABLET, FILM COATED ORAL at 17:36

## 2022-01-28 RX ADMIN — OXYCODONE HYDROCHLORIDE 10 MG: 5 TABLET ORAL at 09:20

## 2022-01-28 RX ADMIN — ACETAMINOPHEN 650 MG: 325 TABLET, FILM COATED ORAL at 12:29

## 2022-01-28 RX ADMIN — KETOROLAC TROMETHAMINE 10 MG: 15 INJECTION, SOLUTION INTRAMUSCULAR; INTRAVENOUS at 02:58

## 2022-01-28 RX ADMIN — OXYCODONE HYDROCHLORIDE 5 MG: 5 TABLET ORAL at 05:54

## 2022-01-28 RX ADMIN — KETOROLAC TROMETHAMINE 10 MG: 15 INJECTION, SOLUTION INTRAMUSCULAR; INTRAVENOUS at 12:28

## 2022-01-28 RX ADMIN — ACETAMINOPHEN 650 MG: 325 TABLET, FILM COATED ORAL at 00:25

## 2022-01-28 RX ADMIN — METHOCARBAMOL 500 MG: 500 TABLET ORAL at 12:29

## 2022-01-28 RX ADMIN — METHOCARBAMOL 500 MG: 500 TABLET ORAL at 19:37

## 2022-01-28 RX ADMIN — METHOCARBAMOL 500 MG: 500 TABLET ORAL at 08:52

## 2022-01-28 RX ADMIN — OXYCODONE HYDROCHLORIDE 10 MG: 5 TABLET ORAL at 19:37

## 2022-01-28 RX ADMIN — BISACODYL 10 MG: 10 SUPPOSITORY RECTAL at 08:52

## 2022-01-28 RX ADMIN — SIMETHICONE 80 MG: 80 TABLET, CHEWABLE ORAL at 21:51

## 2022-01-28 RX ADMIN — FAMOTIDINE 20 MG: 20 TABLET ORAL at 08:53

## 2022-01-28 RX ADMIN — SENNOSIDES AND DOCUSATE SODIUM 2 TABLET: 50; 8.6 TABLET ORAL at 19:37

## 2022-01-28 RX ADMIN — SENNOSIDES AND DOCUSATE SODIUM 2 TABLET: 50; 8.6 TABLET ORAL at 08:52

## 2022-01-28 RX ADMIN — METHOCARBAMOL 500 MG: 500 TABLET ORAL at 17:36

## 2022-01-28 ASSESSMENT — ACTIVITIES OF DAILY LIVING (ADL)
ADLS_ACUITY_SCORE: 8
ADLS_ACUITY_SCORE: 6
ADLS_ACUITY_SCORE: 8
ADLS_ACUITY_SCORE: 6
ADLS_ACUITY_SCORE: 6
ADLS_ACUITY_SCORE: 8
ADLS_ACUITY_SCORE: 6

## 2022-01-28 NOTE — PHARMACY-CONSULT NOTE
Admission Medication History Completed by Pharmacy    See [facility] Admission Navigator for allergy information, preferred outpatient pharmacy, prior to admission medications and immunization status.     Medication History Sources:     Spoke with patient via telephone    Changes made to PTA medication list (reason):    Added:   o Probiotic  o Saline nasal spray  o Artificial tears    Deleted: None    Changed:   o Anastrozole, added strength of 1mg  o Calcium-vitamin changed from daily to twice daily    Additional Information:    Nahomi knew her medications/supplements well.    Prior to Admission medications    Medication Sig Last Dose Taking? Auth Provider   cetirizine (ZYRTEC) 5 MG tablet Take 5 mg by mouth daily Seasonal only More than a month at Unknown time Yes Reported, Patient   hypromellose (ARTIFICIAL TEARS) 0.5 % SOLN ophthalmic solution 1 drop every hour as needed for dry eyes Past Week at Unknown time Yes Unknown, Entered By History   Probiotic Product (PROBIOTIC-10 PO) Take 1 capsule by mouth 2 times daily Past Week at Unknown time Yes Unknown, Entered By History   sodium chloride (OCEAN) 0.65 % nasal spray Spray 1 spray into both nostrils daily as needed for congestion Past Week at Unknown time Yes Unknown, Entered By History   ANASTROZOLE PO Take 1 mg by mouth daily Holding for surgery  12/31/2021  Reported, Patient   calcium-vitamin D-vitamin K (VIACTIV) 500-500-40 MG-UNT-MCG CHEW Take 1 tablet by mouth 2 times daily  1/24/2022  Reported, Patient   fish oil-omega-3 fatty acids 1000 MG capsule Take 1 g by mouth 2 times daily 1/24/2022  Reported, Patient   Glucosamine Sulfate 1500 MG PACK Take 1,500 mg by mouth 2 times daily 1/24/2022  Reported, Patient   multivitamin w/minerals (MULTI-VITAMIN) tablet Take 1 tablet by mouth daily 1/24/2022  Reported, Patient       Date completed: 01/28/22    Medication history completed by: Selena Morales, Pharm.D., [facility]  Pager [phone]

## 2022-01-28 NOTE — PHARMACY-CONSULT NOTE
D/I: 61 year old female s/p kidney donation surgery on 1/27/2022.  Medications have been reviewed by the pharmacist for efficacy, appropriate dose, medication interactions and potential adverse effects.      A: Medications reviewed for this patient as above. No medication issues were noted.  Patient had splenectomy during kidney donation surgery so she will need to following vaccinations either at discharge or 2 weeks post splenectomy:                Hib (Haemophilus influenzae type B), if you have never received or don't know if you have received it                Influenza, yearly                Meningococcal ACWY (MenACWY, Mengveo or Menactra) Will need 2 doses 8 weeks apart, then a booster dose every 5 years.                Meningococcal B (MenB, Bexsero) Will need 2 doses 30 days apart, then a booster 1 year later then every 2-3 years after that.                Pneumococcal: One dose of Prevnar 13 and then 8 weeks later Pneumovax 23. Nahomi will need a booster of Pneumovax 5 years later.  P: Pharmacy will continue to monitor for any potential medication issues, and will make recommendations as appropriate. Medication therapy needs for discharge planning will continue to be addressed throughout the current admission via multidisciplinary rounds and order review.    Selena Morales, Pharm.D., [facility]  Pager [phone]

## 2022-01-28 NOTE — PLAN OF CARE
Vitals: stable on 2 L NC. Capno stable.   Blood glucose: NA  Pain/nausea: given scheduled toradol and robaxin  Diet: regular.   Lines: PIV R infusing [id number]. PIVL SL.   : sousa . Positional. Needs UA POD # 1.   GI: no BM, not passing gas.   Drains: KATHLEEN L OP zero, minimal. Dressing CDI.   Skin: incision abdomen x 3 lap sites CDI. Abdominal binder on.   Mobility: refused to get and walk, stated will try tomorrow.     Continue monitor.

## 2022-01-28 NOTE — PROGRESS NOTES
Transplant Surgery  Inpatient Daily Progress Note  01/28/2022    Ms. Herrera is 62 yo female s/p laparoscopic left donor nephrectomy complicated by splenic bleeding requiring splenectomy. Issues Overnight: None     Patient Vitals for the past 24 hrs:   BP Temp Temp src Pulse Resp SpO2   01/28/22 1129 132/74 98.6  F (37  C) Oral 50 18 97 %   01/28/22 0737 130/77 98.2  F (36.8  C) Oral 54 16 98 %   01/28/22 0400 108/57 99.3  F (37.4  C) Oral 57 16 97 %   01/28/22 0000 116/59 99.1  F (37.3  C) Oral 63 16 97 %   01/27/22 2207 131/70 98.7  F (37.1  C) Oral 97 16 97 %   01/27/22 1945 131/72 -- -- 58 16 93 %   01/27/22 1930 131/72 -- -- 55 -- --   01/27/22 1915 135/76 -- -- 60 -- --   01/27/22 1902 (!) 140/81 97.7  F (36.5  C) Oral 58 23 99 %   01/27/22 1900 (!) 147/79 -- -- 60 -- --   01/27/22 1830 -- 96.9  F (36.1  C) Oral -- -- --   01/27/22 1800 130/70 -- -- 53 14 98 %   01/27/22 1745 -- -- -- -- -- 99 %   01/27/22 1730 -- -- -- -- -- 100 %   01/27/22 1715 -- -- -- -- -- 100 %   01/27/22 1700 127/73 -- -- (!) 49 14 100 %   01/27/22 1600 133/74 -- -- 56 16 99 %   01/27/22 1500 138/81 -- -- 57 -- 99 %   01/27/22 1400 129/72 -- -- 51 14 100 %       Pain: Visual Analog Score: 3  Nausea:    [x]    None      []    Some, but not needing medication    []    Yes, needing medication      []    Dry Retching    []    Vomited    DREAMS Performance:    DRinking: Yes   Eating: Yes, slowly   Analgesia: Tolerable with pain meds   Mobilizing:  Yes   Slept: Yes    Passed flatus? Yes   Incision(s): C/D/I, covered  Abdomen: mild distention, appropriately tender, soft  Extremities: no cyanosis or edema     Allergies:   Allergies   Allergen Reactions     Lactose Cramps, Diarrhea, GI Disturbance and Nausea       Medications:  Prescription Medications as of 1/28/2022       Rx Number Disp Refills Start End Last Dispensed Date Next Fill Date Owning Pharmacy    ANASTROZOLE PO            Sig: Take 1 mg by mouth daily Holding for surgery      "Class: Historical    Route: Oral    calcium-vitamin D-vitamin K (VIACTIV) 500-500-40 MG-UNT-MCG CHEW            Sig: Take 1 tablet by mouth 2 times daily     Class: Historical    Route: Oral    cetirizine (ZYRTEC) 5 MG tablet            Sig: Take 5 mg by mouth daily Seasonal only    Class: Historical    Route: Oral    fish oil-omega-3 fatty acids 1000 MG capsule            Sig: Take 1 g by mouth 2 times daily    Class: Historical    Route: Oral    Glucosamine Sulfate 1500 MG PACK            Sig: Take 1,500 mg by mouth 2 times daily    Class: Historical    Route: Oral    hypromellose (ARTIFICIAL TEARS) 0.5 % SOLN ophthalmic solution            Si drop every hour as needed for dry eyes    Class: Historical    multivitamin w/minerals (MULTI-VITAMIN) tablet            Sig: Take 1 tablet by mouth daily    Class: Historical    Route: Oral    Probiotic Product (PROBIOTIC-10 PO)            Sig: Take 1 capsule by mouth 2 times daily    Class: Historical    Route: Oral    sodium chloride (OCEAN) 0.65 % nasal spray            Sig: Spray 1 spray into both nostrils daily as needed for congestion    Class: Historical    Route: Both Nostrils      Hospital Medications as of 2022       Dose Frequency Start End    acetaminophen (TYLENOL) tablet 650 mg 650 mg EVERY 6 HOURS 2022    Admin Instructions: Administer for multimodal surgical pain management.  Pharmacy to time the next dose 8 hours from PRE OP dose.  Maximum acetaminophen dose from all sources = 75 mg/kg/day not to exceed 4 grams/day.    Class: E-Prescribe    Route: Oral    bisacodyl (DULCOLAX) Suppository 10 mg 10 mg DAILY 2022     Admin Instructions: Start POD 1, then daily until BM. Hold for loose stools.  Hold for loose stools.    Class: E-Prescribe    Route: Rectal    bupivacaine liposome (EXPAREL) LA inj was given in the infiltration site to produce post-op analgesia. Duration of action is up to 72 hours. Other \"[place]\" meds should not be " given for 96 hours except for lidocaine 4% patch. This is for INFORMATION ONLY.  CONTINUOUS PRN 1/27/2022 1/31/2022    Admin Instructions: NURSE to notify physician if patient has ringing in the ears, metallic taste in the mouth, or circumoral numbness. DO NOT administer any additional local anesthetics POST-OPERATIVELY without contacting Anesthesia first.    Class: E-Prescribe    Route: Does not apply    dextrose 5% in lactated ringers infusion  CONTINUOUS 1/27/2022     Admin Instructions: Heplock when oral intake greater than 500 mL.    Class: E-Prescribe    Route: Intravenous    famotidine (PEPCID) tablet 20 mg 20 mg DAILY 1/28/2022     Class: E-Prescribe    Route: Oral    fentaNYL (PF) (SUBLIMAZE) injection 10-20 mcg 10-20 mcg EVERY 1 HOUR PRN 1/27/2022     Admin Instructions: For Severe breakthrough pain  Start at the lowest dose. May adjust dose by 5 mcg every 1 hour as needed. Maximum individual dose is 20 mcg.    Route: Intravenous    ketorolac (TORADOL) injection 10 mg 10 mg EVERY 8 HOURS 1/27/2022 1/29/2022    Admin Instructions: Pharmacy to time the first dose based on the timing of the INTRA-OP dose.  Can cause pain on injection.  If ordered intravenously (IV) : administer through a running maintenance fluid over 1 minute followed by a flush.  If patient complains of pain on injection, may dilute 15-30 mg in 5 mL and push over 1 to 2 minutes.      Class: E-Prescribe    Route: Intravenous    methocarbamol (ROBAXIN) tablet 500 mg 500 mg 4 TIMES DAILY 1/27/2022     Class: E-Prescribe    Route: Oral    naloxone (NARCAN) injection 0.2 mg 0.2 mg EVERY 2 MIN PRN 1/27/2022     Admin Instructions: Administer intravenous route when available and notify provider when administered.  For unintended sedation or respiratory depression if all of the below criteria are met:  ~ respiratory rate LESS than or EQUAL to 8.   ~SaO2 less than 92% and or/end-tidal CO2 is greater than 50.  ~ the patient is receiving an opioid,  "has unintended sedations assessed as RASS (-3), and is currently not on mechanical ventilation.  RASS scale moderate (-3) is movement or eye opening to voice but no eye contact.    Patient Monitoring  Once the patient has demonstrated a response to the naloxone, continue to monitor respiratory rate, depth, oxygen saturation and end-tidal CO2 (if available) every 15 minutes x 2, then every 30 minutes x 2, then every 1 hour x 1 after each naloxone dose.  Consider transfer to ICU if patient respiratory parameters have not improved after 4 naloxone doses.    Class: E-Prescribe    Route: Intravenous    Linked Group 1: \"Or\" Linked Group Details        naloxone (NARCAN) injection 0.2 mg 0.2 mg EVERY 2 MIN PRN 1/27/2022     Admin Instructions: Administer intramuscular if an intravenous route is not available and notify provider when administered.  For unintended sedation or respiratory depression if all of the below criteria are met:  ~ respiratory rate LESS than or EQUAL to 8.   ~SaO2 less than 92% and or/end-tidal CO2 is greater than 50.  ~ the patient is receiving an opioid, has unintended sedations assessed as RASS (-3), and is currently not on mechanical ventilation.  RASS scale moderate (-3) is movement or eye opening to voice but no eye contact.    Patient Monitoring  Once the patient has demonstrated a response to the naloxone, continue to monitor respiratory rate, depth, oxygen saturation and end-tidal CO2 (if available) every 15 minutes x 2, then every 30 minutes x 2, then every 1 hour x 1 after each naloxone dose.  Consider transfer to ICU if patient respiratory parameters have not improved after 4 naloxone doses.    Class: E-Prescribe    Route: Intramuscular    Linked Group 1: \"Or\" Linked Group Details        naloxone (NARCAN) injection 0.4 mg 0.4 mg EVERY 2 MIN PRN 1/27/2022     Admin Instructions: Administer intravenous route when available and notify provider when administered.  For unintended sedation or " "respiratory depression if all of the below criteria are met:  ~ respiratory rate LESS than or EQUAL to 8.  ~ SaO2 less than 92% and or/end-tidal CO2 is greater than 50.  ~ the patient is receiving an opioid, has unintended sedation assessed as RASS (-4) or (-5) and patient is currently not on mechanical ventilation.  RASS scale (-4) is deep sedation with no response to voice but movement or eye opening to physical stimulation.  RASS scale (-5) is unarousable.      Patient Monitoring  Once the patient has demonstrated a response to the naloxone, continue to monitor respiratory rate, depth, oxygen saturation and end-tidal CO2 (if available) every 15 minutes x 2, then every 30 minutes x 2, then every 1 hour x 1 after each naloxone dose.  Consider transfer to ICU if patient respiratory parameters have not improved after 4 naloxone doses.    Class: E-Prescribe    Route: Intravenous    Linked Group 1: \"Or\" Linked Group Details        naloxone (NARCAN) injection 0.4 mg 0.4 mg EVERY 2 MIN PRN 1/27/2022     Admin Instructions: Administer intramuscular if an intravenous route is not available and notify provider when administered.  For unintended sedation or respiratory depression if all of the below criteria are met:  ~ respiratory rate LESS than or EQUAL to 8.  ~ SaO2 less than 92% and or/end-tidal CO2 is greater than 50.  ~ the patient is receiving an opioid, has unintended sedation assessed as RASS (-4) or (-5) and patient is currently not on mechanical ventilation.  RASS scale (-4) is deep sedation with no response to voice but movement or eye opening to physical stimulation.  RASS scale (-5) is unarousable.      Patient Monitoring  Once the patient has demonstrated a response to the naloxone, continue to monitor respiratory rate, depth, oxygen saturation and end-tidal CO2 (if available) every 15 minutes x 2, then every 30 minutes x 2, then every 1 hour x 1 after each naloxone dose.  Consider transfer to ICU if patient " "respiratory parameters have not improved after 4 naloxone doses.    Class: E-Prescribe    Route: Intramuscular    Linked Group 1: \"Or\" Linked Group Details        ondansetron (ZOFRAN) injection 4 mg 4 mg EVERY 6 HOURS PRN 1/27/2022     Admin Instructions: This is Step 1 of nausea and vomiting management.  If nausea not resolved in 15 minutes, go to Step 2 prochlorperazine (COMPAZINE).  Irritant.    Class: E-Prescribe    Route: Intravenous    Linked Group 2: \"Or\" Linked Group Details        ondansetron (ZOFRAN-ODT) ODT tab 4 mg 4 mg EVERY 6 HOURS PRN 1/27/2022     Admin Instructions: This is Step 1 of nausea and vomiting management.  If nausea not resolved in 15 minutes, go to Step 2 prochlorperazine (COMPAZINE). Do not push through foil backing. Peel back foil and gently remove. Place on tongue immediately. Administration with liquid unnecessary  With dry hands, peel back foil backing and gently remove tablet. Do not push oral disintegrating tablet through foil backing. Administer immediately on tongue and oral disintegrating tablet dissolves in seconds, then swallow with saliva. Liquid not required.    Class: E-Prescribe    Route: Oral    Linked Group 2: \"Or\" Linked Group Details        oxyCODONE (ROXICODONE) tablet 5-10 mg 5-10 mg EVERY 3 HOURS PRN 1/27/2022     Admin Instructions: ~ Start at the lowest dose.  ~ May adjust dose by 5 mg every 3 hours to optimize patient mobilization and comfort as needed.  Maximum individual dose is 10 mg.  ~ Hold dose for analgesic side effects.    ~ Notify provider to assess patient for uncontrolled pain or analgesic side effects.   ~ Maximum total is 80 mg in 24 hours.    Route: Oral    prochlorperazine (COMPAZINE) injection 10 mg 10 mg EVERY 6 HOURS PRN 1/27/2022     Admin Instructions: This is Step 2 of nausea and vomiting management.   If nausea not resolved in 15-30 minutes, Notify provider.    Class: E-Prescribe    Route: Intravenous    Linked Group 3: \"Or\" Linked Group " "Details        prochlorperazine (COMPAZINE) tablet 10 mg 10 mg EVERY 6 HOURS PRN 1/27/2022     Admin Instructions: This is Step 2 of nausea and vomiting management.   If nausea not resolved in 15-30 minutes, Notify provider.    Class: E-Prescribe    Route: Oral    Linked Group 3: \"Or\" Linked Group Details        senna-docusate (SENOKOT-S/PERICOLACE) 8.6-50 MG per tablet 1 tablet 1 tablet 2 TIMES DAILY 1/27/2022     Admin Instructions: If no bowel movement in 24 hours, increase to 2 tablets PO.  Hold for loose stools.  Hold for loose stools.    Class: E-Prescribe    Route: Oral    Linked Group 4: \"Or\" Linked Group Details        senna-docusate (SENOKOT-S/PERICOLACE) 8.6-50 MG per tablet 2 tablet 2 tablet 2 TIMES DAILY 1/27/2022     Admin Instructions: Hold for loose stools.  Hold for loose stools.    Class: E-Prescribe    Route: Oral    Linked Group 4: \"Or\" Linked Group Details              Labs:  Hgb stable, lipase WNL s/p splenectomy    Assessment: Post-operative day #1, doing well.    Plan:   -Encouraged ambulation and ISB   -Continue GI and DVT prophylaxis  -Pain control: Scheduled acetaminophen. Encourage oral analgesia prn.  -Cho removed  -Discontinued IV fluid  -Will plan for first dose of post splenectomy vaccines to be given in clinic:  MenB (Bexsero) first dose here and second dose after 30 days   MenACWY (Menectra or Menveo) first dose here, second at 8 weeks   Pneumococcal 13 here, then 23 Pneumovax after 8 weeks   Haemophilus influenza (Hib) here  (Patient received influenza vaccine already this year)       Discharge planning: Possibly tomorrow    Yue Delvalle PA-C  Division of Transplantation  Pager [phone]    I have reviewed history, examined patient and discussed plan with the fellow/resident/MELISSA.  I concur with the findings in this note.                 "

## 2022-01-28 NOTE — DISCHARGE SUMMARY
[facility]    Discharge Summary  Solid Organ Transplant Surgery    Date of Admission:  1/27/2022  Date of Discharge:  1/30/2022    Discharge Diagnoses   Active Problems:    Encounter for donation of kidney    Acute post-operative pain    S/P splenectomy    Procedure/Surgery Information   Procedure: Procedure(s):  Laparoscopic Hand Assisted Living Related Kidney Donor  Laparoscopic splenectomy   Surgeon(s): Surgeon(s) and Role:     * Ian Faust MD - Primary     * Lio Hamm MD - Assisting     * Ruperto Barragan MD - Fellow - Assisting     History of Present Illness   Lucero Herrera is a 61 year old female who presented for nephrectomy for kidney donation.    Hospital Course     S/p donor nephrectomy: Lucero Herrera was admitted on 1/27/2022 and underwent nephrectomy for kidney donation. She is now tolerating oral diet, ambulating, and pain controlled by day of discharge.    S/p Splenectomy: Intra-op splenic bleeding requiring splenectomy. She will need the following immunizations outpatient:   -MenB (Bexsero) first dose in clinic and second dose at least 30 days after.  -MenACWY (Menectra or Menveo) first dose clinic, second 8 weeks after.  -Pneumococcal 13 in clinic, then 23 Pneumovax after 8 weeks.  -Haemophilus influenza (Hib) in clinic.    Acute post op pain: Pain controlled with PRN Tylenol, oxycodone, Robaxin. Continue bowel regimen with opioids including Senokot-S, Miralax, and suppositories.     Discharge Disposition   Discharged to home   Condition at discharge: Stable    Primary Care Physician   Provider Not In System    Physical Exam   Temp: 98.5  F (36.9  C) Temp src: Oral BP: (!) 150/92 Pulse: 64   Resp: 18 SpO2: 94 % O2 Device: None (Room air)    Vitals:    01/27/22 0540 01/29/22 0626   Weight: 67 kg (147 lb 11.3 oz) 67.4 kg (148 lb 9.6 oz)     Vital Signs with Ranges  Temp:  [98  F (36.7  C)-99  F (37.2  C)] 98.5  F (36.9  C)  Pulse:   "[64-77] 64  Resp:  [18-24] 18  BP: (147-170)/(80-92) 150/92  SpO2:  [93 %-97 %] 94 %  I/O last 3 completed shifts:  In: 720 [P.O.:720]  Out: 3855 [Urine:3850; Drains:5]    Constitutional: resting comfortably in chair  Eyes: EOMI  Respiratory: unlabored on RA  Cardiovascular: perfused  GI: abdomen soft. Incision and lap sites closed with steri strips. KATHLEEN with scant output.   Genitourinary: no Cho present  Skin: warm, dry  Musculoskeletal: no edema noted  Neurologic: A&Ox4  Neuropsychiatric: anxious, pleasant    Consultations This Hospital Stay   PHARMACY IP CONSULT  SOT MEDICATION HISTORY IP PHARMACY CONSULT  CARE MANAGEMENT / SOCIAL WORK IP CONSULT    Time Spent on this Encounter   I have spent greater than 30 minutes on this discharge.    Discharge Orders      Reason for your hospital stay    You were admitted for kidney donation. You are discharging home in stable condition with close follow up.     Activity    Your activity upon discharge: Don't lift anything heavier than 10 pounds for 8 weeks (or longer if your surgeon has discussed this with you).  Walk regularly.    OK to drive only after no longer taking narcotics AND you feel comfortable working the breaks/clutch suddenly if needed.  Limit sports and strenuous activities/'core' exercises for 8 weeks.  If you experience pain after exertion, try an ice pack or warm pack to the area.   Wear abdominal binder for comfort if you desire.    You have been instructed to avoid NSAIDs (medications such as ibuprofen, \"Motrin\", naproxen, diclofenac) as these medications may affect the remaining kidney. Take other medications as prescribed.    Keep narcotics out of reach of children. When finished with them, please destroy/discard any remaining pills responsibly. Often, your [facility] government office, local police station or pharmacy will have a drug deposit box.     Adult [facility] Follow-up and recommended labs and tests    Follow up with Dr. Faust in Transplant Clinic " on 2/7/22. You will receive your post-splenectomy vaccines at this time.     If you need to change your appointment please contact your coordinator at [phone].    Appointments on [facility] and/or [facility] (with [facility] or [facility] provider or service). Call [phone] if you haven't heard regarding these appointments within 7 days of discharge.     When to contact your care team    Your transplant coordinator if you have any of the following:   Swelling, oozing, worsening pain, unusual redness around the incision  Fever of 101 F or higher   Increasing abdominal pain   Nausea or vomiting   Severe diarrhea, bloating, or constipation     Any concerns or questions, please call your Transplant coordinator:    Phone: [phone].  If they are not available, the on call coordinator/MD will help you with your concern.  If you are unable to reach a coordinator and have an urgent medical questions, please call the hospital at [phone] and ask to have the Pancreas Transplant Surgery fellow on-call paged.     Wound care and dressings    Instructions to care for your wound at home: If your incisions have GLUE, gently wash with soap and water, but do not scrub. Do not soak in a bath until the glue has naturally fallen off and any openings are closed (at least 2 weeks).    If your incisions have STERI STRIPS, leave steri strips in place until they curl and peel off. Please don't shower until 48 hours after surgery. Then gently wash with soap and water, but do not scrub them. Do not soak in a bath until the strips have naturally fallen off and any openings are closed (at least 2 weeks).     Diet    Follow this diet upon discharge: Keep yourself well hydrated (goal 1500 ml/day).   Eat lightly the first week as tolerated and avoid constipating foods.  If you are constipated, take a stool softener like Senna, Dulcolax, Miralax, or a Fleets enema (available over the counter).     Discharge Medications   Current Discharge  Medication List      START taking these medications    Details   acetaminophen (TYLENOL) 325 MG tablet Take 2 tablets (650 mg) by mouth every 6 hours as needed for mild pain  Qty: 60 tablet, Refills: 0    Associated Diagnoses: Acute post-operative pain      bisacodyl (DULCOLAX) 10 MG suppository Place 1 suppository (10 mg) rectally daily as needed for constipation  Qty: 10 suppository, Refills: 0    Associated Diagnoses: Encounter for donation of kidney      methocarbamol (ROBAXIN) 500 MG tablet Take 1 tablet (500 mg) by mouth 4 times daily as needed for muscle spasms (Pain)  Qty: 30 tablet, Refills: 0    Associated Diagnoses: Acute post-operative pain      oxyCODONE (ROXICODONE) 5 MG tablet Take 1-2 tablets (5-10 mg) by mouth every 4 hours as needed for moderate to severe pain  Qty: 10 tablet, Refills: 0    Associated Diagnoses: Acute post-operative pain      polyethylene glycol (MIRALAX) 17 GM/Dose powder Take 17 g by mouth daily  Qty: 510 g, Refills: 0    Associated Diagnoses: Encounter for donation of kidney      senna-docusate (SENOKOT-S/PERICOLACE) 8.6-50 MG tablet Take 1-2 tablets by mouth 2 times daily as needed for constipation  Qty: 60 tablet, Refills: 0    Associated Diagnoses: Encounter for donation of kidney         CONTINUE these medications which have NOT CHANGED    Details   cetirizine (ZYRTEC) 5 MG tablet Take 5 mg by mouth daily Seasonal only      hypromellose (ARTIFICIAL TEARS) 0.5 % SOLN ophthalmic solution 1 drop every hour as needed for dry eyes      Probiotic Product (PROBIOTIC-10 PO) Take 1 capsule by mouth 2 times daily      sodium chloride (OCEAN) 0.65 % nasal spray Spray 1 spray into both nostrils daily as needed for congestion      ANASTROZOLE PO Take 1 mg by mouth daily Holding for surgery       fish oil-omega-3 fatty acids 1000 MG capsule Take 1 g by mouth 2 times daily      Glucosamine Sulfate 1500 MG PACK Take 1,500 mg by mouth 2 times daily      multivitamin w/minerals  (MULTI-VITAMIN) tablet Take 1 tablet by mouth daily         STOP taking these medications       calcium-vitamin D-vitamin K (VIACTIV) 500-500-40 MG-UNT-MCG CHEW Comments:   Reason for Stopping:             Allergies   Allergies   Allergen Reactions     Lactose Cramps, Diarrhea, GI Disturbance and Nausea     Data   Most Recent 3 CBC's:Recent Labs   Lab Test 01/29/22  0620 01/27/22  2104 01/27/22  1252   WBC 10.2 8.4 12.2*   HGB 11.1* 11.3* 12.0   MCV 97 95 95    157 181      Most Recent 3 BMP's:  Recent Labs   Lab Test 01/30/22  0655 01/29/22  0620 01/27/22  2104    139 137   POTASSIUM 3.6 3.8 4.1   CHLORIDE 104 106 104   CO2 25 28 26   BUN 10 11 12   CR 1.01 1.25* 1.12*   ANIONGAP 7 5 7   SHERIE 9.1 8.8 8.8   GLC 86 93 158*     Most Recent 2 LFT's:  Recent Labs   Lab Test 07/16/21  0729   AST 20   ALT 20   ALKPHOS 61   BILITOTAL 0.5     Most Recent INR's and Anticoagulation Dosing History:  Anticoagulation Dose History     Recent Dosing and Labs Latest Ref Rng & Units 7/16/2021    INR 0.85 - 1.15 1.08        Most Recent 3 Troponin's:No lab results found.  Most Recent Cholesterol Panel:  Recent Labs   Lab Test 07/16/21  0729   CHOL 201*   *   HDL 67   TRIG 73     Most Recent 6 Bacteria Isolates From Any Culture (See EPIC Reports for Culture Details):No lab results found.  Most Recent TSH, T4 and A1c Labs:  Recent Labs   Lab Test 07/16/21  0729   A1C 5.4     Results for orders placed or performed in visit on 01/24/22   XR Chest 2 Views    Narrative    Chest 2 views    INDICATION: Encounter for donation of kidney    COMPARISON: 7/16/2021    FINDINGS: Heart size and shape appear normal. Lungs and pulmonary  vascularity appear normal. Minimal degenerative changes are present in  the midthoracic spine. Surgical clips in the left axilla an apparently  also in the left breast again noted.      Impression    IMPRESSION: Thoracic spondylosis. Prior left breast surgery. No acute  findings otherwise.    AKIRA CLEMENT  MD JESUS         SYSTEM ID:  [id number]

## 2022-01-28 NOTE — PLAN OF CARE
[id number] Nursing Shift Report:  VSS, lower heart rate, Nahomi states that is baseline for her. States she works out on a daily basis. Did give oxycodone x2 prn to help with her postop pain. Did c/o of some left shoulder discomfort that she states felt like it was moving around. She stated that she realized it was gas pain and by shift end stated it was gone. At 0615 I sent her UA/UC after explaining to her what the plan was this morning as far as what to expect this morning. UA was collected and sent, sousa drained and removed and then with  standby assist she walked around the nurses station. When back in her room she opted to wait on the suppository since she already passing gas. Discontinued CAPNO and IVF's due to adequate fluid intake. Instructed her on the importance of calling for assistance to the bathroom and the need for someone to help assist her til we are sure she is steady on her feet independently. Place a hat in the commode so that I  can further assess her urine output.Will continue to monitor and report chad further changes.

## 2022-01-28 NOTE — PROGRESS NOTES
INDEPENDENT LIVING DONOR ADVOCATE NOTE:  D:  Nahomi Herrera is a living kidney donor, POD 1.  I:  I met with her at bedside to thank her for kidney donation and to assess for any JOSEPH needs.  I assessed the patient's mood/affect, plans for recovery, and any feelings of regret/remorse regarding donation.  We reviewed the importance of completing follow-up labs and surveys at six months, 1 year and 2 years after donation to monitor kidney health and the impact donation has had on their life post donation.   A:  She is resting in bed on the unit. Her  is at her side.  She appears to be in a fair mood and affect is congruent. She is tearful.  She is complaining of pain and her nurse came in to give her pain medication while I was visiting.  Kirbye reports that her sister/ recipient  Has some complications with her sugery but they have been told that the kidney is doing well now.  Patient describes donation recovery as going well .  She and I discussed how to reach me should she have any post operative JOSEPH needs.  She reports no feelings of regret or remorse about donation.  She denies any discharge planning needs at this time.  Patient plans to discharge to home with the care and support of her / family.   P: JOSEPH will remain available to assist with any support and JOSEPH needs, both inpatient and outpatient, as needed.  Patient is aware of follow-up recommendations and has my contact information.

## 2022-01-28 NOTE — PROGRESS NOTES
"  Transplant Surgery Brief Progress Note  Surgery Cross-Cover  Post Op Check    01/27/2022    Lucero Herrera is a 61 year old female with PMH of breast cancer, now POD#0 s/p laparoscopic hand-assisted living related kidney donor nephrectomy, laparoscopic splenectomy.     Patient seen at bedside, doing very well. Pain well controlled. Tolerating sips of clear liquids with no n/v. Denies subjective fever/chills, SOB, chest pain, or dizziness. Adequate UOP via Cho.     /72 (BP Location: Left arm)   Pulse 58   Temp 97.7  F (36.5  C) (Oral)   Resp 16   Ht 1.575 m (5' 2\")   Wt 67 kg (147 lb 11.3 oz)   SpO2 93%   BMI 27.02 kg/m      Gen: AOx3, NAD, appears comfortable  Resp: breathing non-labored on room air  Abdomen: Abdominal binder in place. Soft, non-distended, expected post-operative tenderness. KATHLEEN drain with serosanguinous output. Incision covered with primapore dressing clean and dry.   Extremities: warm and well perfused  Skin: warm, dry    A/P: No acute post-op issues. Continue plan of care per primary team.       Oriana Herrera MD  Surgery PGY-1  See [facility] for on-call pager information.   "

## 2022-01-28 NOTE — CONSULTS
LIVING DONOR SOCIAL WORK NOTE:  D:  Nahomi is a living kidney donor, POD 1.  I:  I met with Nahomi at bedside to thank her for her donation and to assess for any psychosocial needs and to assist with discharge planning.  I assessed the patient's mood/affect, plans for recovery, and any feelings of regret or remorse regarding donation.   A:  Nahomi is sitting in a chair in her room, has been walking around unit.  She appears to be in a good mood and affect is congruent.  Nahomi states that she has some discomfort from gas, but is overall doing well.  She  reports that her sister, the recipient is doing well so far. She has transferred to the floor and Nahomi hopes to see her shortly.  Patient describes donation recovery as going smoothly so far.  She and I discussed how to reach the donor SW should she have any post operative psychosocial needs.  She reports no feelings of regret or remorse about donation.  She denies any discharge planning needs at this time.  Patient plans to discharge to her sisters home with the care and support of .  She plans to return to her home in [place] the day after her post-op visit.    P: Donor  will remain available to assist with any psychosocial and advocacy needs, both inpatient and outpatient, as needed.  Patient is aware of follow-up recommendations and has my contact information.      Shirley Graham, [facility]  Transplant   Phone: [phone]

## 2022-01-28 NOTE — PLAN OF CARE
Admitted/transferred from:   Time of arrival on unit 6524  2 RN full  skin assessment completed by nara Mitchell.   Skin assessment finding: skin intact, no problems   Interventions/actions: incision abdomen lap sites CDI x 3 primapores. Sacrum mepilex CDI.        Will continue to monitor.

## 2022-01-29 ENCOUNTER — DOCUMENTATION ONLY (OUTPATIENT)
Dept: TRANSPLANT | Facility: CLINIC | Age: 62
End: 2022-01-29

## 2022-01-29 LAB
ANION GAP SERPL CALCULATED.3IONS-SCNC: 5 MMOL/L (ref 3–14)
BUN SERPL-MCNC: 11 MG/DL (ref 7–30)
CALCIUM SERPL-MCNC: 8.8 MG/DL (ref 8.5–10.1)
CHLORIDE BLD-SCNC: 106 MMOL/L (ref 94–109)
CO2 SERPL-SCNC: 28 MMOL/L (ref 20–32)
CREAT SERPL-MCNC: 1.25 MG/DL (ref 0.52–1.04)
ERYTHROCYTE [DISTWIDTH] IN BLOOD BY AUTOMATED COUNT: 13.6 % (ref 10–15)
GFR SERPL CREATININE-BSD FRML MDRD: 48 ML/MIN/1.73M2
GLUCOSE BLD-MCNC: 93 MG/DL (ref 70–99)
HCT VFR BLD AUTO: 34.1 % (ref 35–47)
HGB BLD-MCNC: 11.1 G/DL (ref 11.7–15.7)
LIPASE SERPL-CCNC: 175 U/L (ref 73–393)
MCH RBC QN AUTO: 31.5 PG (ref 26.5–33)
MCHC RBC AUTO-ENTMCNC: 32.6 G/DL (ref 31.5–36.5)
MCV RBC AUTO: 97 FL (ref 78–100)
PLATELET # BLD AUTO: 164 10E3/UL (ref 150–450)
POTASSIUM BLD-SCNC: 3.8 MMOL/L (ref 3.4–5.3)
RBC # BLD AUTO: 3.52 10E6/UL (ref 3.8–5.2)
SODIUM SERPL-SCNC: 139 MMOL/L (ref 133–144)
WBC # BLD AUTO: 10.2 10E3/UL (ref 4–11)

## 2022-01-29 PROCEDURE — 36415 COLL VENOUS BLD VENIPUNCTURE: CPT | Performed by: STUDENT IN AN ORGANIZED HEALTH CARE EDUCATION/TRAINING PROGRAM

## 2022-01-29 PROCEDURE — 250N000011 HC RX IP 250 OP 636: Performed by: STUDENT IN AN ORGANIZED HEALTH CARE EDUCATION/TRAINING PROGRAM

## 2022-01-29 PROCEDURE — 83690 ASSAY OF LIPASE: CPT | Performed by: NURSE PRACTITIONER

## 2022-01-29 PROCEDURE — 250N000013 HC RX MED GY IP 250 OP 250 PS 637

## 2022-01-29 PROCEDURE — 250N000011 HC RX IP 250 OP 636

## 2022-01-29 PROCEDURE — 250N000013 HC RX MED GY IP 250 OP 250 PS 637: Performed by: NURSE PRACTITIONER

## 2022-01-29 PROCEDURE — 85027 COMPLETE CBC AUTOMATED: CPT | Performed by: STUDENT IN AN ORGANIZED HEALTH CARE EDUCATION/TRAINING PROGRAM

## 2022-01-29 PROCEDURE — 250N000013 HC RX MED GY IP 250 OP 250 PS 637: Performed by: STUDENT IN AN ORGANIZED HEALTH CARE EDUCATION/TRAINING PROGRAM

## 2022-01-29 PROCEDURE — 120N000011 HC R&B TRANSPLANT UMMC

## 2022-01-29 PROCEDURE — 82310 ASSAY OF CALCIUM: CPT | Performed by: STUDENT IN AN ORGANIZED HEALTH CARE EDUCATION/TRAINING PROGRAM

## 2022-01-29 RX ORDER — HYDRALAZINE HYDROCHLORIDE 20 MG/ML
10 INJECTION INTRAMUSCULAR; INTRAVENOUS EVERY 6 HOURS PRN
Status: DISCONTINUED | OUTPATIENT
Start: 2022-01-29 | End: 2022-01-30

## 2022-01-29 RX ORDER — ACETAMINOPHEN 325 MG/1
650 TABLET ORAL EVERY 6 HOURS PRN
Status: DISCONTINUED | OUTPATIENT
Start: 2022-01-29 | End: 2022-01-30

## 2022-01-29 RX ORDER — POLYETHYLENE GLYCOL 3350 17 G/17G
17 POWDER, FOR SOLUTION ORAL DAILY
Status: DISCONTINUED | OUTPATIENT
Start: 2022-01-29 | End: 2022-01-30 | Stop reason: HOSPADM

## 2022-01-29 RX ADMIN — ACETAMINOPHEN 650 MG: 325 TABLET, FILM COATED ORAL at 00:26

## 2022-01-29 RX ADMIN — METHOCARBAMOL 500 MG: 500 TABLET ORAL at 16:49

## 2022-01-29 RX ADMIN — SENNOSIDES AND DOCUSATE SODIUM 2 TABLET: 50; 8.6 TABLET ORAL at 20:11

## 2022-01-29 RX ADMIN — METHOCARBAMOL 500 MG: 500 TABLET ORAL at 20:11

## 2022-01-29 RX ADMIN — KETOROLAC TROMETHAMINE 10 MG: 15 INJECTION, SOLUTION INTRAMUSCULAR; INTRAVENOUS at 03:47

## 2022-01-29 RX ADMIN — DOCUSATE SODIUM 286 ML: 50 LIQUID ORAL at 00:02

## 2022-01-29 RX ADMIN — BISACODYL 10 MG: 10 SUPPOSITORY RECTAL at 07:58

## 2022-01-29 RX ADMIN — SIMETHICONE 80 MG: 80 TABLET, CHEWABLE ORAL at 16:49

## 2022-01-29 RX ADMIN — METHOCARBAMOL 500 MG: 500 TABLET ORAL at 12:52

## 2022-01-29 RX ADMIN — ACETAMINOPHEN 650 MG: 325 TABLET, FILM COATED ORAL at 06:26

## 2022-01-29 RX ADMIN — POLYETHYLENE GLYCOL 3350 17 G: 17 POWDER, FOR SOLUTION ORAL at 17:34

## 2022-01-29 RX ADMIN — ACETAMINOPHEN 650 MG: 325 TABLET, FILM COATED ORAL at 17:34

## 2022-01-29 RX ADMIN — FAMOTIDINE 20 MG: 20 TABLET ORAL at 07:58

## 2022-01-29 RX ADMIN — SENNOSIDES AND DOCUSATE SODIUM 2 TABLET: 50; 8.6 TABLET ORAL at 07:58

## 2022-01-29 RX ADMIN — HYDRALAZINE HYDROCHLORIDE 10 MG: 20 INJECTION INTRAMUSCULAR; INTRAVENOUS at 23:40

## 2022-01-29 RX ADMIN — METHOCARBAMOL 500 MG: 500 TABLET ORAL at 07:58

## 2022-01-29 ASSESSMENT — ACTIVITIES OF DAILY LIVING (ADL)
ADLS_ACUITY_SCORE: 10
ADLS_ACUITY_SCORE: 8
ADLS_ACUITY_SCORE: 10
ADLS_ACUITY_SCORE: 6
ADLS_ACUITY_SCORE: 10
ADLS_ACUITY_SCORE: 6
ADLS_ACUITY_SCORE: 10
ADLS_ACUITY_SCORE: 6
ADLS_ACUITY_SCORE: 10
ADLS_ACUITY_SCORE: 6
ADLS_ACUITY_SCORE: 10
ADLS_ACUITY_SCORE: 6

## 2022-01-29 ASSESSMENT — MIFFLIN-ST. JEOR: SCORE: 1187.3

## 2022-01-29 NOTE — PLAN OF CARE
"/69 (BP Location: Left arm)   Pulse 50   Temp 98.2  F (36.8  C) (Oral)   Resp 18   Ht 1.575 m (5' 2\")   Wt 67 kg (147 lb 11.3 oz)   SpO2 99%   BMI 27.02 kg/m     Neuro: A/Ox4  VS: VSS on RA except bradycardia at baseline  Pain: c/o Abdominal /incisional pain on scheduled tylenol, Robaxin, Toradol and PRN oxydocone  GI: on regular diet. Denies nausea. Small BM this afternoon and pt is c/o constipation and abdomina discomfort. General surgery was paged requesting enema, no order yet  Skin: Abdominal incision surgical dressing intact/dry/clean. KATHLEEN intact with bright red drainage (total of 20ml), sample sent to lab   PIV SL  Activity - Independent in room, walked in lujan with walker/GB  Education - pain/bowel management  Plan of Care - Continue with POC    "

## 2022-01-29 NOTE — PHARMACY-TRANSPLANT NOTE
[facility] Organ Transplant Donor Prior to Discharge Note  61 year old female s/p native renal nephrectomy for kidney donation surgery on 1/27/2022 c/b splenectomy:    Plan for splenectomy vaccines to be given first doses at 2 week follow up outpatient visit   Hib (Haemophilus influenzae type B), if you have never received or don't know if you have received it  Influenza, yearly  Meningococcal ACWY (MenACWY, Mengveo or Menactra) Will need 2 doses 8 weeks apart, then a booster dose every 5 years.  Meningococcal B (MenB, Bexsero) Will need 2 doses 30 days apart, then a booster 1 year later then every 2-3 years after that.  Pneumococcal: One dose of Prevnar 13 and then 8 weeks later Pneumovax 23. Nahomi will need a booster of Pneumovax 5 years later.    Pharmacy has monitored for any potential medication issues.  In anticipation for discharge, medication therapy needs have been addressed daily throughout the current admission via multidisciplinary rounds and/or discussions, order verification, daily clinical pharmacy review, and communication with prescribers.    Nick Lester PharmD, [facility]  Inpatient clinical pharmacist

## 2022-01-29 NOTE — PLAN OF CARE
"BP (!) 158/88 (BP Location: Left arm)   Pulse 75   Temp 99.3  F (37.4  C) (Oral)   Resp 18   Ht 1.575 m (5' 2\")   Wt 67 kg (147 lb 11.3 oz)   SpO2 95%   BMI 27.02 kg/m      [id number]  Neuro:  Pt. alert & Ox4  Behavior:  Pt. calm & cooperative with cares.   Activity: Pt. up with SBA, gait belt & walker.  Vital: Pt. hypertensive, T-max: 99.6 oral, OVSS on RA.  LDAs: Right & left PIVs saline locked.   Cardiac: Hypertension  Respiratory: LS clear  BG: N/A  GI/: Pt voiding spontaneously, no stools this shift. Pt. c/o constipation & received tap water enema x1 with no results & Pink lady enema x1 with no results. Surgery cross-covered notified of constipation & ordered enemas last evening.   Skin: Op. dressing with scant serosang. drainage.   Pain: Abdominal pain with some relief from sched. Tylenol & Toradol,  prn Oxycodone x1.  PRN Simethicone x1 for cramping with some relief.  Diet: Regular  Labs/Imaging: Morning labs pending.  Plan: Continue to follow POC & notify MD with change in status.      "

## 2022-01-29 NOTE — PLAN OF CARE
[id number]  Neuro: Pt. alert & Ox4  Behavior: Pt. pleasant & cooperative with cares.   Activity: Pt. up with SBA.  Vital: T-99.6 oral, OVSS on RA.  LDAs: 2 PIVs saline locked.   Cardiac: WNL  Respiratory: LS clear  GI/: Pt. voiding adequate amounts, stools x this shift.   Skin: Midline incision & lap. sites. c/d/I.  Pain: Abdominal pain moderately controlled with sched. Toradol, prn Oxycodone.  Diet: Regular  Labs/Imaging:  Morning labs pending.   Plan: Continue to follow POC & notify MD with change in status.

## 2022-01-29 NOTE — PROGRESS NOTES
Transplant Surgery  Inpatient Daily Progress Note  01/29/2022    Ms. Herrera is 60 yo female s/p laparoscopic left donor nephrectomy complicated by splenic bleeding requiring splenectomy. Issues Overnight: Bloating, gas pain.      Patient Vitals for the past 24 hrs:   BP Temp Temp src Pulse Resp SpO2 Weight   01/29/22 1651 (!) 165/91 -- -- 72 18 -- --   01/29/22 1556 (!) 158/92 98.9  F (37.2  C) Oral 68 18 94 % --   01/29/22 1137 (!) 146/74 98.1  F (36.7  C) Oral 58 18 96 % --   01/29/22 0746 139/79 99  F (37.2  C) Oral 72 18 96 % --   01/29/22 0626 -- -- -- -- -- -- 67.4 kg (148 lb 9.6 oz)   01/29/22 0337 (!) 158/88 99.3  F (37.4  C) Oral 75 18 95 % --   01/29/22 0114 (!) 155/81 -- -- 69 -- -- --   01/28/22 2346 (!) 161/90 99.5  F (37.5  C) Oral 72 18 95 % --   01/28/22 2016 (!) 142/80 99.6  F (37.6  C) Oral 61 18 93 % --       Pain: Visual Analog Score: 3  Nausea:    []    None      [x]    Some, but not needing medication    []    Yes, needing medication      []    Dry Retching    []    Vomited    DREAMS Performance:    DRinking: Yes   Eating: Yes, but minimally   Analgesia: Tolerable with pain meds   Mobilizing:  Yes   Slept: Yes    Passed flatus? Yes   Incision(s): C/D/I, open to air with steri strips in place  Abdomen: mild distention, appropriately tender, soft  Extremities: no cyanosis or edema     Allergies:   Allergies   Allergen Reactions     Lactose Cramps, Diarrhea, GI Disturbance and Nausea       Medications:  Prescription Medications as of 1/29/2022       Rx Number Disp Refills Start End Last Dispensed Date Next Fill Date Owning Pharmacy    ANASTROZOLE PO            Sig: Take 1 mg by mouth daily Holding for surgery     Class: Historical    Route: Oral    calcium-vitamin D-vitamin K (VIACTIV) 500-500-40 MG-UNT-MCG CHEW            Sig: Take 1 tablet by mouth 2 times daily     Class: Historical    Route: Oral    cetirizine (ZYRTEC) 5 MG tablet            Sig: Take 5 mg by mouth daily Seasonal only     "Class: Historical    Route: Oral    fish oil-omega-3 fatty acids 1000 MG capsule            Sig: Take 1 g by mouth 2 times daily    Class: Historical    Route: Oral    Glucosamine Sulfate 1500 MG PACK            Sig: Take 1,500 mg by mouth 2 times daily    Class: Historical    Route: Oral    hypromellose (ARTIFICIAL TEARS) 0.5 % SOLN ophthalmic solution            Si drop every hour as needed for dry eyes    Class: Historical    multivitamin w/minerals (MULTI-VITAMIN) tablet            Sig: Take 1 tablet by mouth daily    Class: Historical    Route: Oral    Probiotic Product (PROBIOTIC-10 PO)            Sig: Take 1 capsule by mouth 2 times daily    Class: Historical    Route: Oral    sodium chloride (OCEAN) 0.65 % nasal spray            Sig: Spray 1 spray into both nostrils daily as needed for congestion    Class: Historical    Route: Both Nostrils      Hospital Medications as of 2022       Dose Frequency Start End    acetaminophen (TYLENOL) tablet 650 mg 650 mg EVERY 6 HOURS PRN 2022     Admin Instructions: Maximum acetaminophen dose from all sources = 75 mg/kg/day not to exceed 4 grams/day.    Class: E-Prescribe    Route: Oral    acetaminophen (TYLENOL) tablet 650 mg (Completed) 650 mg EVERY 6 HOURS 2022    Admin Instructions: Administer for multimodal surgical pain management.  Pharmacy to time the next dose 8 hours from PRE OP dose.  Maximum acetaminophen dose from all sources = 75 mg/kg/day not to exceed 4 grams/day.    Class: E-Prescribe    Route: Oral    bisacodyl (DULCOLAX) Suppository 10 mg 10 mg DAILY 2022     Admin Instructions: Start POD 1, then daily until BM. Hold for loose stools.  Hold for loose stools.    Class: E-Prescribe    Route: Rectal    bupivacaine liposome (EXPAREL) LA inj was given in the infiltration site to produce post-op analgesia. Duration of action is up to 72 hours. Other \"[place]\" meds should not be given for 96 hours except for lidocaine 4% patch. " This is for INFORMATION ONLY.  CONTINUOUS PRN 1/27/2022 1/31/2022    Admin Instructions: NURSE to notify physician if patient has ringing in the ears, metallic taste in the mouth, or circumoral numbness. DO NOT administer any additional local anesthetics POST-OPERATIVELY without contacting Anesthesia first.    Class: E-Prescribe    Route: Does not apply    dextrose 5% in lactated ringers infusion  CONTINUOUS 1/27/2022     Admin Instructions: Heplock when oral intake greater than 500 mL.    Class: E-Prescribe    Route: Intravenous    Enema Compound (docusate/mag cit/mineral oil/NaPhos) SIMPLE (Completed) 286 mL ONCE 1/28/2022 1/29/2022    Class: E-Prescribe    Route: Rectal    famotidine (PEPCID) tablet 20 mg 20 mg DAILY 1/28/2022     Class: E-Prescribe    Route: Oral    ketorolac (TORADOL) injection 10 mg (Completed) 10 mg EVERY 8 HOURS 1/27/2022 1/29/2022    Admin Instructions: Pharmacy to time the first dose based on the timing of the INTRA-OP dose.  Can cause pain on injection.  If ordered intravenously (IV) : administer through a running maintenance fluid over 1 minute followed by a flush.  If patient complains of pain on injection, may dilute 15-30 mg in 5 mL and push over 1 to 2 minutes.      Class: E-Prescribe    Route: Intravenous    magnesium hydroxide (MILK OF MAGNESIA) suspension 30 mL 30 mL DAILY PRN 1/29/2022     Admin Instructions: Shake well.  Hold for loose stools.    Class: E-Prescribe    Route: Oral    methocarbamol (ROBAXIN) tablet 500 mg 500 mg 4 TIMES DAILY 1/27/2022     Class: E-Prescribe    Route: Oral    naloxone (NARCAN) injection 0.2 mg 0.2 mg EVERY 2 MIN PRN 1/27/2022     Admin Instructions: Administer intravenous route when available and notify provider when administered.  For unintended sedation or respiratory depression if all of the below criteria are met:  ~ respiratory rate LESS than or EQUAL to 8.   ~SaO2 less than 92% and or/end-tidal CO2 is greater than 50.  ~ the patient is  "receiving an opioid, has unintended sedations assessed as RASS (-3), and is currently not on mechanical ventilation.  RASS scale moderate (-3) is movement or eye opening to voice but no eye contact.    Patient Monitoring  Once the patient has demonstrated a response to the naloxone, continue to monitor respiratory rate, depth, oxygen saturation and end-tidal CO2 (if available) every 15 minutes x 2, then every 30 minutes x 2, then every 1 hour x 1 after each naloxone dose.  Consider transfer to ICU if patient respiratory parameters have not improved after 4 naloxone doses.    Class: E-Prescribe    Route: Intravenous    Linked Group 1: \"Or\" Linked Group Details        naloxone (NARCAN) injection 0.2 mg 0.2 mg EVERY 2 MIN PRN 1/27/2022     Admin Instructions: Administer intramuscular if an intravenous route is not available and notify provider when administered.  For unintended sedation or respiratory depression if all of the below criteria are met:  ~ respiratory rate LESS than or EQUAL to 8.   ~SaO2 less than 92% and or/end-tidal CO2 is greater than 50.  ~ the patient is receiving an opioid, has unintended sedations assessed as RASS (-3), and is currently not on mechanical ventilation.  RASS scale moderate (-3) is movement or eye opening to voice but no eye contact.    Patient Monitoring  Once the patient has demonstrated a response to the naloxone, continue to monitor respiratory rate, depth, oxygen saturation and end-tidal CO2 (if available) every 15 minutes x 2, then every 30 minutes x 2, then every 1 hour x 1 after each naloxone dose.  Consider transfer to ICU if patient respiratory parameters have not improved after 4 naloxone doses.    Class: E-Prescribe    Route: Intramuscular    Linked Group 1: \"Or\" Linked Group Details        naloxone (NARCAN) injection 0.4 mg 0.4 mg EVERY 2 MIN PRN 1/27/2022     Admin Instructions: Administer intravenous route when available and notify provider when administered.  For " "unintended sedation or respiratory depression if all of the below criteria are met:  ~ respiratory rate LESS than or EQUAL to 8.  ~ SaO2 less than 92% and or/end-tidal CO2 is greater than 50.  ~ the patient is receiving an opioid, has unintended sedation assessed as RASS (-4) or (-5) and patient is currently not on mechanical ventilation.  RASS scale (-4) is deep sedation with no response to voice but movement or eye opening to physical stimulation.  RASS scale (-5) is unarousable.      Patient Monitoring  Once the patient has demonstrated a response to the naloxone, continue to monitor respiratory rate, depth, oxygen saturation and end-tidal CO2 (if available) every 15 minutes x 2, then every 30 minutes x 2, then every 1 hour x 1 after each naloxone dose.  Consider transfer to ICU if patient respiratory parameters have not improved after 4 naloxone doses.    Class: E-Prescribe    Route: Intravenous    Linked Group 1: \"Or\" Linked Group Details        naloxone (NARCAN) injection 0.4 mg 0.4 mg EVERY 2 MIN PRN 1/27/2022     Admin Instructions: Administer intramuscular if an intravenous route is not available and notify provider when administered.  For unintended sedation or respiratory depression if all of the below criteria are met:  ~ respiratory rate LESS than or EQUAL to 8.  ~ SaO2 less than 92% and or/end-tidal CO2 is greater than 50.  ~ the patient is receiving an opioid, has unintended sedation assessed as RASS (-4) or (-5) and patient is currently not on mechanical ventilation.  RASS scale (-4) is deep sedation with no response to voice but movement or eye opening to physical stimulation.  RASS scale (-5) is unarousable.      Patient Monitoring  Once the patient has demonstrated a response to the naloxone, continue to monitor respiratory rate, depth, oxygen saturation and end-tidal CO2 (if available) every 15 minutes x 2, then every 30 minutes x 2, then every 1 hour x 1 after each naloxone dose.  Consider " "transfer to ICU if patient respiratory parameters have not improved after 4 naloxone doses.    Class: E-Prescribe    Route: Intramuscular    Linked Group 1: \"Or\" Linked Group Details        ondansetron (ZOFRAN) injection 4 mg 4 mg EVERY 6 HOURS PRN 1/27/2022     Admin Instructions: This is Step 1 of nausea and vomiting management.  If nausea not resolved in 15 minutes, go to Step 2 prochlorperazine (COMPAZINE).  Irritant.    Class: E-Prescribe    Route: Intravenous    Linked Group 2: \"Or\" Linked Group Details        ondansetron (ZOFRAN-ODT) ODT tab 4 mg 4 mg EVERY 6 HOURS PRN 1/27/2022     Admin Instructions: This is Step 1 of nausea and vomiting management.  If nausea not resolved in 15 minutes, go to Step 2 prochlorperazine (COMPAZINE). Do not push through foil backing. Peel back foil and gently remove. Place on tongue immediately. Administration with liquid unnecessary  With dry hands, peel back foil backing and gently remove tablet. Do not push oral disintegrating tablet through foil backing. Administer immediately on tongue and oral disintegrating tablet dissolves in seconds, then swallow with saliva. Liquid not required.    Class: E-Prescribe    Route: Oral    Linked Group 2: \"Or\" Linked Group Details        oxyCODONE (ROXICODONE) tablet 5-10 mg 5-10 mg EVERY 3 HOURS PRN 1/27/2022     Admin Instructions: ~ Start at the lowest dose.  ~ May adjust dose by 5 mg every 3 hours to optimize patient mobilization and comfort as needed.  Maximum individual dose is 10 mg.  ~ Hold dose for analgesic side effects.    ~ Notify provider to assess patient for uncontrolled pain or analgesic side effects.   ~ Maximum total is 80 mg in 24 hours.    Route: Oral    polyethylene glycol (MIRALAX) Packet 17 g 17 g DAILY 1/29/2022     Admin Instructions: 1 Packet = 17 grams. Mix each gram with at least 1/2 ounce (15 mL) of water -   8 ounces for 17 g dose, 4 ounces for 8.5 g dose, 2 ounces for 4 g dose.  Follow with the same volume of " "water.  Hold for loose stools unless being administered as part of a bowel prep regimen or bowel clean out.        Class: E-Prescribe    Route: Oral    prochlorperazine (COMPAZINE) injection 10 mg 10 mg EVERY 6 HOURS PRN 1/27/2022     Admin Instructions: This is Step 2 of nausea and vomiting management.   If nausea not resolved in 15-30 minutes, Notify provider.    Class: E-Prescribe    Route: Intravenous    Linked Group 3: \"Or\" Linked Group Details        prochlorperazine (COMPAZINE) tablet 10 mg 10 mg EVERY 6 HOURS PRN 1/27/2022     Admin Instructions: This is Step 2 of nausea and vomiting management.   If nausea not resolved in 15-30 minutes, Notify provider.    Class: E-Prescribe    Route: Oral    Linked Group 3: \"Or\" Linked Group Details        senna-docusate (SENOKOT-S/PERICOLACE) 8.6-50 MG per tablet 1 tablet 1 tablet 2 TIMES DAILY 1/27/2022     Admin Instructions: If no bowel movement in 24 hours, increase to 2 tablets PO.  Hold for loose stools.  Hold for loose stools.    Class: E-Prescribe    Route: Oral    Linked Group 4: \"Or\" Linked Group Details        senna-docusate (SENOKOT-S/PERICOLACE) 8.6-50 MG per tablet 2 tablet 2 tablet 2 TIMES DAILY 1/27/2022     Admin Instructions: Hold for loose stools.  Hold for loose stools.    Class: E-Prescribe    Route: Oral    Linked Group 4: \"Or\" Linked Group Details        simethicone (MYLICON) chewable tablet 80 mg 80 mg EVERY 6 HOURS PRN 1/28/2022     Class: E-Prescribe    Route: Oral          Labs:  Hgb stable ~11  Cr 1.3 (1.1)  Drain lipase 4576, serum lipase WNL    Assessment: Post-operative day #2. She overall feels well but reports abdominal discomfort she describes as bloating. Was able to pass gas and had BM after MOM this morning. Drinking plenty of water, tolerated small breakfast, and up ambulating in room and lujan ad isabel.     Plan:   -Encouraged ambulation and ISB   -Continue GI and DVT prophylaxis  -Pain control: PRN acetaminophen. Scheduled Toradol. " Encourage oral analgesia prn.  -Cho removed  -Bowel regimen  -Will plan for first dose of post splenectomy vaccines to be given in clinic:  MenB (Bexsero) first dose here and second dose after 30 days   MenACWY (Menectra or Menveo) first dose here, second at 8 weeks   Pneumococcal 13 here, then 23 Pneumovax after 8 weeks   Haemophilus influenza (Hib) here  (Patient received influenza vaccine already this year)       Discharge planning: Possibly tomorrow    Minal Unger NP   Division of Transplantation  Pager [phone]    I have reviewed history, examined patient and discussed plan with the fellow/resident/MELISSA.  I concur with the findings in this note.

## 2022-01-29 NOTE — PROGRESS NOTES
"POD 2 s/p kidney donation and splenectomy. Inpatient team continues to follow closely with anticipated discharge this weekend.     \"What to expect after donation\" provided and reviewed during pre op visit and post op follow up in place.     Organ specific education completed, and discharge planning has been conducted with multidisciplinary transplant care team including physicians, pharmacy, nutrition, and social work.     Will continue to follow and assist with coordination of care.   "

## 2022-01-29 NOTE — PROVIDER NOTIFICATION
Chest circumference 30 1/2 inches   Surgery cross-cover paged: Pt. requesting enema d/t pt. very uncomfortable & not passing gas & had small result after suppository earlier this shift.  Thanks,  Claire [phone]  MD ordered 1 time tap water enema which pt. received with no results. MD then ordered Pink lady enema x1 & prn Simethicone.

## 2022-01-30 VITALS
DIASTOLIC BLOOD PRESSURE: 92 MMHG | HEART RATE: 64 BPM | BODY MASS INDEX: 27.34 KG/M2 | OXYGEN SATURATION: 94 % | WEIGHT: 148.6 LBS | TEMPERATURE: 98.5 F | RESPIRATION RATE: 18 BRPM | HEIGHT: 62 IN | SYSTOLIC BLOOD PRESSURE: 150 MMHG

## 2022-01-30 LAB
ANION GAP SERPL CALCULATED.3IONS-SCNC: 7 MMOL/L (ref 3–14)
BUN SERPL-MCNC: 10 MG/DL (ref 7–30)
CALCIUM SERPL-MCNC: 9.1 MG/DL (ref 8.5–10.1)
CHLORIDE BLD-SCNC: 104 MMOL/L (ref 94–109)
CO2 SERPL-SCNC: 25 MMOL/L (ref 20–32)
CREAT SERPL-MCNC: 1.01 MG/DL (ref 0.52–1.04)
GFR SERPL CREATININE-BSD FRML MDRD: 63 ML/MIN/1.73M2
GLUCOSE BLD-MCNC: 86 MG/DL (ref 70–99)
HOLD SPECIMEN: NORMAL
LIPASE FLD-CCNC: 765 U/L
LIPASE SERPL-CCNC: 128 U/L (ref 73–393)
POTASSIUM BLD-SCNC: 3.6 MMOL/L (ref 3.4–5.3)
SODIUM SERPL-SCNC: 136 MMOL/L (ref 133–144)

## 2022-01-30 PROCEDURE — 80048 BASIC METABOLIC PNL TOTAL CA: CPT | Performed by: NURSE PRACTITIONER

## 2022-01-30 PROCEDURE — 250N000013 HC RX MED GY IP 250 OP 250 PS 637: Performed by: STUDENT IN AN ORGANIZED HEALTH CARE EDUCATION/TRAINING PROGRAM

## 2022-01-30 PROCEDURE — 83690 ASSAY OF LIPASE: CPT | Performed by: NURSE PRACTITIONER

## 2022-01-30 PROCEDURE — 250N000011 HC RX IP 250 OP 636: Performed by: STUDENT IN AN ORGANIZED HEALTH CARE EDUCATION/TRAINING PROGRAM

## 2022-01-30 PROCEDURE — 36415 COLL VENOUS BLD VENIPUNCTURE: CPT | Performed by: NURSE PRACTITIONER

## 2022-01-30 PROCEDURE — 250N000013 HC RX MED GY IP 250 OP 250 PS 637: Performed by: NURSE PRACTITIONER

## 2022-01-30 PROCEDURE — 250N000013 HC RX MED GY IP 250 OP 250 PS 637

## 2022-01-30 PROCEDURE — 99024 POSTOP FOLLOW-UP VISIT: CPT | Mod: GC | Performed by: TRANSPLANT SURGERY

## 2022-01-30 RX ORDER — AMOXICILLIN 250 MG
1-2 CAPSULE ORAL 2 TIMES DAILY PRN
Qty: 60 TABLET | Refills: 0 | Status: SHIPPED | OUTPATIENT
Start: 2022-01-30

## 2022-01-30 RX ORDER — METHOCARBAMOL 500 MG/1
500 TABLET, FILM COATED ORAL 4 TIMES DAILY PRN
Qty: 30 TABLET | Refills: 0 | Status: SHIPPED | OUTPATIENT
Start: 2022-01-30

## 2022-01-30 RX ORDER — ACETAMINOPHEN 325 MG/1
650 TABLET ORAL EVERY 6 HOURS PRN
Qty: 60 TABLET | Refills: 0 | Status: SHIPPED | OUTPATIENT
Start: 2022-01-30

## 2022-01-30 RX ORDER — POLYETHYLENE GLYCOL 3350 17 G/17G
17 POWDER, FOR SOLUTION ORAL DAILY
Qty: 510 G | Refills: 0 | Status: SHIPPED | OUTPATIENT
Start: 2022-01-30

## 2022-01-30 RX ORDER — ACETAMINOPHEN 325 MG/1
650 TABLET ORAL EVERY 6 HOURS
Status: DISCONTINUED | OUTPATIENT
Start: 2022-01-30 | End: 2022-01-30 | Stop reason: HOSPADM

## 2022-01-30 RX ORDER — ACETAMINOPHEN 325 MG/1
650 TABLET ORAL ONCE
Status: COMPLETED | OUTPATIENT
Start: 2022-01-30 | End: 2022-01-30

## 2022-01-30 RX ORDER — OXYCODONE HYDROCHLORIDE 5 MG/1
5-10 TABLET ORAL EVERY 4 HOURS PRN
Qty: 10 TABLET | Refills: 0 | Status: SHIPPED | OUTPATIENT
Start: 2022-01-30

## 2022-01-30 RX ORDER — ACETAMINOPHEN 325 MG/1
975 TABLET ORAL EVERY 6 HOURS
Status: DISCONTINUED | OUTPATIENT
Start: 2022-01-30 | End: 2022-01-30

## 2022-01-30 RX ORDER — BISACODYL 10 MG
10 SUPPOSITORY, RECTAL RECTAL DAILY PRN
Qty: 10 SUPPOSITORY | Refills: 0 | Status: SHIPPED | OUTPATIENT
Start: 2022-01-30

## 2022-01-30 RX ADMIN — POLYETHYLENE GLYCOL 3350 17 G: 17 POWDER, FOR SOLUTION ORAL at 08:01

## 2022-01-30 RX ADMIN — ACETAMINOPHEN 650 MG: 325 TABLET, FILM COATED ORAL at 01:04

## 2022-01-30 RX ADMIN — ACETAMINOPHEN 650 MG: 325 TABLET, FILM COATED ORAL at 10:26

## 2022-01-30 RX ADMIN — SENNOSIDES AND DOCUSATE SODIUM 2 TABLET: 50; 8.6 TABLET ORAL at 08:01

## 2022-01-30 RX ADMIN — BISACODYL 10 MG: 10 SUPPOSITORY RECTAL at 10:22

## 2022-01-30 RX ADMIN — FAMOTIDINE 20 MG: 20 TABLET ORAL at 08:01

## 2022-01-30 RX ADMIN — METHOCARBAMOL 500 MG: 500 TABLET ORAL at 13:07

## 2022-01-30 RX ADMIN — METHOCARBAMOL 500 MG: 500 TABLET ORAL at 08:01

## 2022-01-30 RX ADMIN — ONDANSETRON 4 MG: 2 INJECTION INTRAMUSCULAR; INTRAVENOUS at 01:04

## 2022-01-30 RX ADMIN — ACETAMINOPHEN 650 MG: 325 TABLET, FILM COATED ORAL at 04:27

## 2022-01-30 ASSESSMENT — ACTIVITIES OF DAILY LIVING (ADL)
ADLS_ACUITY_SCORE: 6

## 2022-01-30 NOTE — PLAN OF CARE
"BP (!) 159/88 (BP Location: Left arm)   Pulse 68   Temp 99  F (37.2  C) (Oral)   Resp 18   Ht 1.575 m (5' 2\")   Wt 67.4 kg (148 lb 9.6 oz)   SpO2 95%   BMI 27.18 kg/m     Neuro: A/Ox3  VS: Elevated BP (Provider was notified and no intervention yet) T 99 and rest of VSS on RA  Pain: c/o headache (provider notified and Tylenol was given) Abdominal/incisional pain controlled by scheduled Robaxin, no PRN oxycodone this shift  GI: on regular diet with poor appetite. Denies nausea. Medium BM this morning after suppository  : Voiding without difficulty  Skin: Abdominal incision intact ESTRADA. KATHLEEN in place with minimal drainage unable to measure  PIV SL  Activity - Independent  Education - Pain/bowel management  Plan of Care - Continue with POC    "

## 2022-01-30 NOTE — PLAN OF CARE
DISCHARGE:  Patient with orders to discharge to her home   PIV removed  Abdominal incision intact ESTRADA, KATHLEEN removed and dressing on site and intact    Patient in stable condition. AVSS. Pt and her family had no further questions regarding discharge instructions and medications. Patient transferred out by her family. Pt had BM before discharge

## 2022-01-30 NOTE — PLAN OF CARE
"BP (!) 147/80 (BP Location: Left arm)   Pulse 77   Temp 98.6  F (37  C) (Oral)   Resp 20   Ht 1.575 m (5' 2\")   Wt 67.4 kg (148 lb 9.6 oz)   SpO2 95%   BMI 27.18 kg/m      Hypertensive 170/84. PRN hydralazine given. Pt. Complained of severe headache 9/10, nausea, and generally feeling unwell. Vitals were stable. PRN zofran and tylenol helped somewhat. Up independent in room. Voiding well. Regular diet. No appetite. Abdominal incision CDI with steri strips. KATHLEEN with minimal serosanguinous output. Will continue to monitor.    Paged Dr. De La Cruz because patient's headache returned, she felt feverish, and awful. No PRN's were due. Dr De La Cruz came to assess and ordered a one time extra dose of tylenol.   "

## 2022-01-31 ENCOUNTER — TELEPHONE (OUTPATIENT)
Dept: TRANSPLANT | Facility: CLINIC | Age: 62
End: 2022-01-31

## 2022-01-31 LAB
PATH REPORT.COMMENTS IMP SPEC: NORMAL
PATH REPORT.COMMENTS IMP SPEC: NORMAL
PATH REPORT.FINAL DX SPEC: NORMAL
PATH REPORT.GROSS SPEC: NORMAL
PATH REPORT.MICROSCOPIC SPEC OTHER STN: NORMAL
PATH REPORT.RELEVANT HX SPEC: NORMAL
PHOTO IMAGE: NORMAL

## 2022-01-31 NOTE — OP NOTE
Transplant Surgery  Operative Note     Procedure Date:  01/27/22    Preoperative Diagnosis:  Healthy kidney donor    Postoperative Diagnosis:  Healthy kidney donor    Procedure:  1. Left Kidney Laparoscopic Hand-Assisted living donor nephrectomy for donation   2. Laparoscopic splenectomy     Secondary Procedure:        Surgeon:    Surgeon(s) and Role:     * Ian Watkins MD - Assisting due to the complexity of the case  Originally started as Dr Watkins's case Due to bleeding from the splenic hilum I took over as primary. Due to the bleeding and additional splenectomy procedure, Dr Watkins's assistance was needed     * Lio Hamm MD - Primary         Fellow/Assistant:    Ruperto Barragan. MD  There was no qualified assistant to assist with this procedure.    Specimen:  Left kidney and ureter    Anesthesia:    General    Urine Output: 300 mls  Estimated Blood Loss: 100 mls   Fluids administered:      Intra Op Events: The spleen was inadvertently injured with controlled bleeding. Due to the high risk of delayed bleed, a decision was made to perform a splenectomy.      Findings: left kidney with single artery, double vein, single ureter             Donor UNOS ID:    [id number]  Flush Start time:  1/27/2022  9:49 AM    Arterial Clamp:    1/27/2022  9:46 AM  Arterial anatomy:  Single    Venous anatomy:    Double  Ureteral anatomy:   Single     Indication: Lucero Herrera presents for Left Kidney donation. The patient has undergone a thorough medical and psychosocial evaluation and was found suitable for voluntary kidney donation. Risks and benefits of donation were discussed. The patient expressed understanding, was willing to proceed, and provided informed consent.    Final ABO/Crossmatch verification: Prior to incision, I verified the donor ABO and recipient ABO. I visually verified that the donor identification, blood type, and other vital data were compatible with the recipient.      Operative  Procedure:  Lucero Herrera was transported to the operating room, placed on the operating table in the right lateral decubitus position, and a universal timeout was performed. Sequential compression devices were placed on both lower extremities and general endotracheal anesthesia was induced. The patient was given IV antibiotics and Cho catheter.  The abdomen was shaved, prepped, and draped in the usual sterile fashion.    We made a 6 cm upper midline incision and carried down thru the linea alba. The peritoneum was opened under direct vision. The hand port was put into position and a left lower quadrant 10 mm port was placed over a trocar with hand assistance. Pneumoperitoneum with CO2 was provided to 12 mmHg. General survey with the laparoscope revealed no unusual findings. An additional 10 mm port was placed in the left lateral abdomen under direct vision.     We released the left colon from its lateral attachments and rotated medially. During this process, an inferior vessel of the splenic hilar was entered. The proximal vessel was controlled with hemostatic clip. The distal portion retracted into the parenchyma which was still bleeding. This was packed with hemostatic agents, that stopped the bleeding temporarily. At this time, patient remained hemodynamically stable. A decision was made to continue with the kidney donation.   The colon was mobilized medially to reveal the kidney and ureter. The ureter was circumferentially dissected free distally toward the pelvis then kidney taking care to preserve its vasculature. The gonadal vein was identified and dissected enbloc with the ureter, and the proximal gonadal vein was doubly ligated and divided near the insertion into the left renal vein. The left adrenal vein was likewise identified, ligated and divided. The renal vein was then circumferentially cleared of extraneous tissue. The kidney and ureter were dissected free posteriorly from the psoas and  multiple lumbar veins were clipped and divided. A second retroaortic vein was encountered about 2cm inferior to the main renale vein. This was significantly smaller than the main renal vein.   We created a plane between the left adrenal gland and the upper pole of the kidney with the harmonic scalpel and the upper pole attachments were released. The anterior and inferior aspects of the renal artery at its origin were cleared of investing lymphatics. The patient was given fluid, mannitol and lasix, and the kidney was then dissected free from its lateral attachments allowing full medial rotation. The remaining lymphatics and fat around the renal artery was cleared. The patient was heparinized and the distal ureter and gonadal vein were clipped and divided. Good urine flow was seen. A laparoscopic KASSY stapler was fired across the renal artery and then the renal vein. The second renal vein was clipped and preserved. The kidney was delivered from the hand port, flushed, and taken to recipient room.     Attention was turned to the spleen which had some oozing near the inferior edge of the hilum. A decision was made to proceed with splenectomy in order to avoid any delayed bleeding. The spleen was circumferentially mobilized from the lateral and superior attachments with the harmonic. After a full mobilization, the splenic hilum was fully encircled. The pancreas was noted to be invested deep in the splenic hilum and was dissected out with the harmonic. The splenic vessels were identified and was stapled across with a vascular stapler. The stapled line was hemostatic. A 19Fr round jeovany drain was placed.    Pneumoperitoneum was reestablished and hemostasis was obtained. Vascular transection sites were visualized and confirmed secure. The colon was placed back in its natural position. The 10 mm port sites were closed with 0-vicryl. The hand port was closed with 0-PDS. Skin incisions were irrigated and closed with 4-0  monocryl and Dermabond. Needle, sponge, and instrument counts were correct x2.  Faculty was present for key portions of the procedure. The patient tolerated the procedure well without apparent complications and was extubated and transferred to PACU in good condition.     I was present during the key portions of the procedure, and I was immediately available for the entire procedure.

## 2022-01-31 NOTE — TELEPHONE ENCOUNTER
Post-hospital outreach call made to check in post kidney donation.     Nahomi reports overall feeling okay.    Incision:CDI, no redness, warmth, swelling or drainage, steri strips intact  Pain: using prn medications as ordered, weaning down oxycodone  Bowels: BM 1/30/22, passing lots of gas, no nausea, taking senokot and Miralax    Appetite: fair, decreased but tolerating small meals  Fluids: taking adequate fluids  Urinary: no issue, urine appears normal  Activity: alternating periods of activity and rest throughout the day, using abd binder.    when up    Reviewed: abdominal precautions, lifting restrictions.  Next appointment: 2/7/22 with Dr. Govind Comer knows how to get in touch with me or an on call coordinator with any questions or concerns.

## 2022-02-01 ENCOUNTER — TELEPHONE (OUTPATIENT)
Dept: TRANSPLANT | Facility: CLINIC | Age: 62
End: 2022-02-01

## 2022-02-01 NOTE — TELEPHONE ENCOUNTER
Call received from Nahomi post kidney donation.     Reports overall feeling pretty good.    Incision:CDI, no issues  Pain: using tylenol only, pain controled   Bowels: large BM 1/31 and 3 loose stools today, has been taking both senakot and miralax, plan established for her hold miralax and take senakot prn   Appetite: improving, tolerating small meals, no nausea  Fluids: taking adequate fluids   Urinary: no issues   Activity: tolerating being up and about.     Nahomi knows how to get in touch with me or an on call coordinator with any questions or concerns.

## 2022-02-03 ENCOUNTER — TELEPHONE (OUTPATIENT)
Dept: TRANSPLANT | Facility: CLINIC | Age: 62
End: 2022-02-03

## 2022-02-03 NOTE — TELEPHONE ENCOUNTER
Post-hospital outreach call made to check in post kidney donation.          Nahomi reports overall feeling okay.Had an episode of dizziness this morning after waking up from a deep sleep. She went back to sleep for a little while and woke up feeling okay. Denies ongoing dizziness or lightheadedness, has been up and about without difficulty. No SOB, fever or other change in symptoms.     Incision:CDI, no redness, warmth or swelling  Pain: controled with prn tylenol only   Bowels: having regular BMs, not feeling constipated    Appetite: improving, tolerating meals, no nausea   Fluids: taking adequate fluids, staying hydrated   Urinary: no issues  Activity: tolerating activity, alternating with periods of rest.    Next appointment: 2/7/22. Plan for post splenectomy vaccinations, [place] [facility] [place] with leadership coverage under donor plan.     Above reviewed with Rain Arvizu NP who advised Nahomi call with any further dizziness.     Nahomi aware to call with any further episodes of dizziness,  knows how to get in touch with me or an on call coordinator with any questions or concerns.

## 2022-02-04 ENCOUNTER — TELEPHONE (OUTPATIENT)
Dept: TRANSPLANT | Facility: CLINIC | Age: 62
End: 2022-02-04

## 2022-02-05 NOTE — TELEPHONE ENCOUNTER
Patient called the writer stating that she felt dizzy.  Patient states she felt like she could not lift her head off the pillow.  She states she is hydrating well upon further discussion with patient she acknowledges that she has not been eating enough and she is only eating very small meals.  Patient states she has no fevers chills nausea vomiting or chest pain.  Patient states her blood pressure has been in the 170s she denies any severe headache or any headache at all she states that she has no abdominal pain no abdominal distention.     Fellow was contacted and stated that if patient's blood pressure remains persistently high and she is dizzy as patient stated it was in the 170s she should go to [facility] ED    Patient states understanding of above stated plan of care

## 2022-02-07 ENCOUNTER — DOCUMENTATION ONLY (OUTPATIENT)
Dept: TRANSPLANT | Facility: CLINIC | Age: 62
End: 2022-02-07

## 2022-02-07 ENCOUNTER — LAB (OUTPATIENT)
Dept: LAB | Facility: CLINIC | Age: 62
End: 2022-02-07

## 2022-02-07 ENCOUNTER — OFFICE VISIT (OUTPATIENT)
Dept: TRANSPLANT | Facility: CLINIC | Age: 62
End: 2022-02-07
Attending: TRANSPLANT SURGERY

## 2022-02-07 VITALS
SYSTOLIC BLOOD PRESSURE: 139 MMHG | HEART RATE: 72 BPM | DIASTOLIC BLOOD PRESSURE: 88 MMHG | OXYGEN SATURATION: 99 % | WEIGHT: 145.3 LBS | BODY MASS INDEX: 26.58 KG/M2

## 2022-02-07 DIAGNOSIS — Z52.4 ENCOUNTER FOR DONATION OF KIDNEY: ICD-10-CM

## 2022-02-07 DIAGNOSIS — Z52.4 KIDNEY DONOR: Primary | ICD-10-CM

## 2022-02-07 DIAGNOSIS — Z90.81 HISTORY OF SPLENECTOMY: ICD-10-CM

## 2022-02-07 DIAGNOSIS — Z52.4 KIDNEY DONOR: ICD-10-CM

## 2022-02-07 LAB
ERYTHROCYTE [DISTWIDTH] IN BLOOD BY AUTOMATED COUNT: 13.1 % (ref 10–15)
HCT VFR BLD AUTO: 36.7 % (ref 35–47)
HGB BLD-MCNC: 12.3 G/DL (ref 11.7–15.7)
MCH RBC QN AUTO: 31.8 PG (ref 26.5–33)
MCHC RBC AUTO-ENTMCNC: 33.5 G/DL (ref 31.5–36.5)
MCV RBC AUTO: 95 FL (ref 78–100)
PLATELET # BLD AUTO: 509 10E3/UL (ref 150–450)
RBC # BLD AUTO: 3.87 10E6/UL (ref 3.8–5.2)
WBC # BLD AUTO: 9.3 10E3/UL (ref 4–11)

## 2022-02-07 PROCEDURE — 90648 HIB PRP-T VACCINE 4 DOSE IM: CPT | Performed by: TRANSPLANT SURGERY

## 2022-02-07 PROCEDURE — G0009 ADMIN PNEUMOCOCCAL VACCINE: HCPCS | Performed by: TRANSPLANT SURGERY

## 2022-02-07 PROCEDURE — 90670 PCV13 VACCINE IM: CPT | Performed by: TRANSPLANT SURGERY

## 2022-02-07 PROCEDURE — 99024 POSTOP FOLLOW-UP VISIT: CPT | Performed by: TRANSPLANT SURGERY

## 2022-02-07 PROCEDURE — 36415 COLL VENOUS BLD VENIPUNCTURE: CPT | Performed by: PATHOLOGY

## 2022-02-07 PROCEDURE — 85027 COMPLETE CBC AUTOMATED: CPT | Performed by: PATHOLOGY

## 2022-02-07 PROCEDURE — 90472 IMMUNIZATION ADMIN EACH ADD: CPT | Performed by: TRANSPLANT SURGERY

## 2022-02-07 PROCEDURE — G0463 HOSPITAL OUTPT CLINIC VISIT: HCPCS | Mod: 25

## 2022-02-07 PROCEDURE — 90620 MENB-4C VACCINE IM: CPT | Performed by: TRANSPLANT SURGERY

## 2022-02-07 PROCEDURE — 250N000021 HC RX MED A9270 GY (STAT IND- M) 250: Performed by: TRANSPLANT SURGERY

## 2022-02-07 PROCEDURE — 90734 MENACWYD/MENACWYCRM VACC IM: CPT | Performed by: TRANSPLANT SURGERY

## 2022-02-07 PROCEDURE — 250N000011 HC RX IP 250 OP 636: Performed by: TRANSPLANT SURGERY

## 2022-02-07 PROCEDURE — 90471 IMMUNIZATION ADMIN: CPT | Performed by: TRANSPLANT SURGERY

## 2022-02-07 RX ADMIN — HAEMOPHILUS B POLYSACCHARIDE CONJUGATE VACCINE FOR INJ 0.5 ML: RECON SOLN at 15:37

## 2022-02-07 RX ADMIN — NEISSERIA MENINGITIDIS SEROGROUP B NHBA FUSION PROTEIN ANTIGEN, NEISSERIA MENINGITIDIS SEROGROUP B FHBP FUSION PROTEIN ANTIGEN AND NEISSERIA MENINGITIDIS SEROGROUP B NADA PROTEIN ANTIGEN 0.5 ML: 50; 50; 50; 25 INJECTION, SUSPENSION INTRAMUSCULAR at 15:39

## 2022-02-07 RX ADMIN — PNEUMOCOCCAL 13-VALENT CONJUGATE VACCINE 0.5 ML: 2.2; 2.2; 2.2; 2.2; 2.2; 4.4; 2.2; 2.2; 2.2; 2.2; 2.2; 2.2; 2.2 INJECTION, SUSPENSION INTRAMUSCULAR at 15:41

## 2022-02-07 RX ADMIN — NEISSERIA MENINGITIDIS GROUP A CAPSULAR POLYSACCHARIDE DIPHTHERIA TOXOID CONJUGATE ANTIGEN, NEISSERIA MENINGITIDIS GROUP C CAPSULAR POLYSACCHARIDE DIPHTHERIA TOXOID CONJUGATE ANTIGEN, NEISSERIA MENINGITIDIS GROUP Y CAPSULAR POLYSACCHARIDE DIPHTHERIA TOXOID CONJUGATE ANTIGEN, AND NEISSERIA MENINGITIDIS GROUP W-135 CAPSULAR POLYSACCHARIDE DIPHTHERIA TOXOID CONJUGATE ANTIGEN 0.5 ML: 4; 4; 4; 4 INJECTION, SOLUTION INTRAMUSCULAR at 15:38

## 2022-02-07 ASSESSMENT — PAIN SCALES - GENERAL: PAINLEVEL: NO PAIN (1)

## 2022-02-07 NOTE — NURSING NOTE
Chief Complaint   Patient presents with     RECHECK     2 week f/u Kidney Donor      Blood pressure 139/88, pulse 72, weight 65.9 kg (145 lb 4.8 oz), SpO2 99 %, not currently breastfeeding.    Alesha Segal, CMA

## 2022-02-07 NOTE — PROGRESS NOTES
Transplant Surgery Kidney Donor Progress Note     Medical record number: [id number]  YOB: 1960,   Date of Visit:  02/07/2022  For followup after kidney donation.    Assessment and Recommendations: Ms. Herrera is doing well after kidney donation. We will give post splenectomy vaccinations here today and she will get follow up dose from PCP.  We discussed antibiotic prophylaxis and how that is not our routine but some providers do so.   She will check in with her PCP for further discussion.    We will check orthostatic vs today.  I do not think she is dehydrated or bleeding.  Will also check CBC.    1. Lifting restrictions: 10 lbs until 6 weeks post donation.  2. Followup: 4 weeks  3. Return to work to be determined per patient's progress, expected between 6-8 weeks after surgery.  4.  Post splenectomy vaccinations here, repeat dosing in 4-8 weeks.  5.  Check orhtostatic VS and hgb.    Total time: 25 minutes  Counselling time: 20 minutes    Ian Faust MD, PhD  Associate Professor of Surgery        ------------------------------------------------------------------------------------------    S: Ms. Herrera donate a kidney 10 days ago.  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  Other than that her hospital stay was uneventful.    She now reports feeling well overall.  She denies f/c/s/c/d.  She does have some light headedness or dizziness when turning in bed and standing from lying down.  She is drinking plenty of fluids.        Medications:  Prescription Medications as of 2/7/2022       Rx Number Disp Refills Start End Last Dispensed Date Next Fill Date Owning Pharmacy    acetaminophen (TYLENOL) 325 MG tablet  60 tablet 0 1/30/2022    [facility] - [place], [place] - [address]    Sig: Take 2 tablets (650 mg) by mouth every 6 hours as needed for mild pain    Class: E-Prescribe    Route: Oral    ANASTROZOLE PO            Sig: Take 1 mg  by mouth daily Holding for surgery     Class: Historical    Route: Oral    bisacodyl (DULCOLAX) 10 MG suppository  10 suppository 0 2022    [address]    Sig: Place 1 suppository (10 mg) rectally daily as needed for constipation    Class: E-Prescribe    Route: Rectal    cetirizine (ZYRTEC) 5 MG tablet            Sig: Take 5 mg by mouth daily Seasonal only    Class: Historical    Route: Oral    fish oil-omega-3 fatty acids 1000 MG capsule            Sig: Take 1 g by mouth 2 times daily    Class: Historical    Route: Oral    Glucosamine Sulfate 1500 MG PACK            Sig: Take 1,500 mg by mouth 2 times daily    Class: Historical    Route: Oral    hypromellose (ARTIFICIAL TEARS) 0.5 % SOLN ophthalmic solution            Si drop every hour as needed for dry eyes    Class: Historical    methocarbamol (ROBAXIN) 500 MG tablet  30 tablet 0 2022    [address]    Sig: Take 1 tablet (500 mg) by mouth 4 times daily as needed for muscle spasms (Pain)    Class: E-Prescribe    Route: Oral    multivitamin w/minerals (MULTI-VITAMIN) tablet            Sig: Take 1 tablet by mouth daily    Class: Historical    Route: Oral    polyethylene glycol (MIRALAX) 17 GM/Dose powder  510 g 0 2022    [address]    Sig: Take 17 g by mouth daily    Class: E-Prescribe    Route: Oral    Probiotic Product (PROBIOTIC-10 PO)            Sig: Take 1 capsule by mouth 2 times daily    Class: Historical    Route: Oral    senna-docusate (SENOKOT-S/PERICOLACE) 8.6-50 MG tablet  60 tablet 0 2022    [address]    Sig: Take 1-2 tablets by mouth 2 times daily as needed for constipation    Class: E-Prescribe    Route: Oral    sodium chloride (OCEAN) 0.65 % nasal spray            Sig: Spray 1 spray into  both nostrils daily as needed for congestion    Class: Historical    Route: Both Nostrils    oxyCODONE (ROXICODONE) 5 MG tablet  10 tablet 0 1/30/2022    [facility] - [place], [place] - [address]    Sig: Take 1-2 tablets (5-10 mg) by mouth every 4 hours as needed for moderate to severe pain    Class: E-Prescribe    Earliest Fill Date: 1/30/2022    Route: Oral      Clinic-Administered Medications as of 2/7/2022       Dose Frequency Start End    haemophilus B polysac-tetanus toxoid (ActHIB) injection 0.5 mL 0.5 mL ONCE 2/7/2022     Route: Intramuscular    meningococcal diptheria polysaccharide (MENACTRA) injection 0.5 mL 0.5 mL ONCE 2/7/2022     Route: Intramuscular    meningococcal vaccine B (Recomb) (BEXSERO) injection 0.5 mL 0.5 mL ONCE 2/7/2022     Route: Intramuscular    pneumococcal (PREVNAR 13) injection 0.5 mL 0.5 mL ONCE 2/7/2022     Admin Instructions: DO NOT give the dose of PCV13 until at least 1 year after the most recent PCV23 dose per CDC guidlines.    Route: Intramuscular          Exam:   Pulse:  [72] 72  BP: (139)/(88) 139/88  SpO2:  [99 %] 99 %  Appearance: in no apparent distress.   Skin: normal  Head and Neck: Normal, no rashes or jaundice  Respiratory: normal respiratory excursions, no audible wheeze  Cardiovascular: RRR  Abdomen: flat, No distention and Surgical scars consistent with history   Extremeties: Edema, none  Neuro: grossly normal       Labs:   Admission on 01/27/2022, Discharged on 01/30/2022   Component Date Value Ref Range Status     Hold Specimen 01/27/2022 Specimen Received   Final     GLUCOSE BY METER POCT 01/27/2022 49* 70 - 99 mg/dL Final    Dr/RN Notified     GLUCOSE BY METER POCT 01/27/2022 54* 70 - 99 mg/dL Final    /RN Notified     GLUCOSE BY METER POCT 01/27/2022 166* 70 - 99 mg/dL Final     Case Report 01/27/2022    Final                    Value:Surgical Pathology Report                         Case: [id number]                                   Authorizing Provider:  Ian Faust MD     Collected:           01/27/2022 10:32 AM          Ordering Location:     [facility] OR                 Received:            01/27/2022 10:55 AM          Pathologist:           Trace Willis MD                                                                 Specimen:    Spleen                                                                                      Final Diagnosis 01/27/2022    Final                    Value:This result contains rich text formatting which cannot be displayed here.     Clinical Information 01/27/2022    Final                    Value:This result contains rich text formatting which cannot be displayed here.     Gross Description 01/27/2022    Final                    Value:This result contains rich text formatting which cannot be displayed here.     Microscopic Description 01/27/2022    Final                    Value:This result contains rich text formatting which cannot be displayed here.     Performing Labs 01/27/2022    Final                    Value:This result contains rich text formatting which cannot be displayed here.     WBC Count 01/27/2022 12.2* 4.0 - 11.0 10e3/uL Final     RBC Count 01/27/2022 3.80  3.80 - 5.20 10e6/uL Final     Hemoglobin 01/27/2022 12.0  11.7 - 15.7 g/dL Final     Hematocrit 01/27/2022 36.1  35.0 - 47.0 % Final     MCV 01/27/2022 95  78 - 100 fL Final     MCH 01/27/2022 31.6  26.5 - 33.0 pg Final     MCHC 01/27/2022 33.2  31.5 - 36.5 g/dL Final     RDW 01/27/2022 13.2  10.0 - 15.0 % Final     Platelet Count 01/27/2022 181  150 - 450 10e3/uL Final     CK 01/27/2022 303* 30 - 225 U/L Final     GLUCOSE BY METER POCT 01/27/2022 193* 70 - 99 mg/dL Final     Color Urine 01/28/2022 Straw  Colorless, Straw, Light Yellow, Yellow Final     Appearance Urine 01/28/2022 Clear  Clear Final     Glucose Urine 01/28/2022 Negative   Negative mg/dL Final     Bilirubin Urine 01/28/2022 Negative  Negative Final     Ketones Urine 01/28/2022 Negative  Negative mg/dL Final     Specific Gravity Urine 01/28/2022 1.004  1.003 - 1.035 Final     Blood Urine 01/28/2022 Negative  Negative Final     pH Urine 01/28/2022 6.0  5.0 - 7.0 Final     Protein Albumin Urine 01/28/2022 Negative  Negative mg/dL Final     Urobilinogen Urine 01/28/2022 Normal  Normal, 2.0 mg/dL Final     Nitrite Urine 01/28/2022 Negative  Negative Final     Leukocyte Esterase Urine 01/28/2022 Negative  Negative Final     RBC Urine 01/28/2022 0  <=2 /HPF Final     WBC Urine 01/28/2022 <1  <=5 /HPF Final     WBC Count 01/27/2022 8.4  4.0 - 11.0 10e3/uL Final     RBC Count 01/27/2022 3.58* 3.80 - 5.20 10e6/uL Final     Hemoglobin 01/27/2022 11.3* 11.7 - 15.7 g/dL Final     Hematocrit 01/27/2022 34.0* 35.0 - 47.0 % Final     MCV 01/27/2022 95  78 - 100 fL Final     MCH 01/27/2022 31.6  26.5 - 33.0 pg Final     MCHC 01/27/2022 33.2  31.5 - 36.5 g/dL Final     RDW 01/27/2022 13.3  10.0 - 15.0 % Final     Platelet Count 01/27/2022 157  150 - 450 10e3/uL Final     Sodium 01/27/2022 137  133 - 144 mmol/L Final     Potassium 01/27/2022 4.1  3.4 - 5.3 mmol/L Final     Chloride 01/27/2022 104  94 - 109 mmol/L Final     Carbon Dioxide (CO2) 01/27/2022 26  20 - 32 mmol/L Final     Anion Gap 01/27/2022 7  3 - 14 mmol/L Final     Urea Nitrogen 01/27/2022 12  7 - 30 mg/dL Final     Creatinine 01/27/2022 1.12* 0.52 - 1.04 mg/dL Final     Calcium 01/27/2022 8.8  8.5 - 10.1 mg/dL Final     Glucose 01/27/2022 158* 70 - 99 mg/dL Final     GFR Estimate 01/27/2022 55* >60 mL/min/1.73m2 Final    Effective December 21, 2021 eGFRcr in adults is calculated using the 2021 CKD-EPI creatinine equation which includes age and gender (Mary et al., NEJ, DOI: 10.1056/YPKMss0987559)     CK 01/28/2022 372* 30 - 225 U/L Final     Lipase 01/28/2022 296  73 - 393 U/L Final     Hold Specimen 01/28/2022 JIC   Final     Lipase  fluid 01/28/2022 4,576  U/L Final     WBC Count 01/29/2022 10.2  4.0 - 11.0 10e3/uL Final     RBC Count 01/29/2022 3.52* 3.80 - 5.20 10e6/uL Final     Hemoglobin 01/29/2022 11.1* 11.7 - 15.7 g/dL Final     Hematocrit 01/29/2022 34.1* 35.0 - 47.0 % Final     MCV 01/29/2022 97  78 - 100 fL Final     MCH 01/29/2022 31.5  26.5 - 33.0 pg Final     MCHC 01/29/2022 32.6  31.5 - 36.5 g/dL Final     RDW 01/29/2022 13.6  10.0 - 15.0 % Final     Platelet Count 01/29/2022 164  150 - 450 10e3/uL Final     Sodium 01/29/2022 139  133 - 144 mmol/L Final     Potassium 01/29/2022 3.8  3.4 - 5.3 mmol/L Final     Chloride 01/29/2022 106  94 - 109 mmol/L Final     Carbon Dioxide (CO2) 01/29/2022 28  20 - 32 mmol/L Final     Anion Gap 01/29/2022 5  3 - 14 mmol/L Final     Urea Nitrogen 01/29/2022 11  7 - 30 mg/dL Final     Creatinine 01/29/2022 1.25* 0.52 - 1.04 mg/dL Final     Calcium 01/29/2022 8.8  8.5 - 10.1 mg/dL Final     Glucose 01/29/2022 93  70 - 99 mg/dL Final     GFR Estimate 01/29/2022 48* >60 mL/min/1.73m2 Final    Effective December 21, 2021 eGFRcr in adults is calculated using the 2021 CKD-EPI creatinine equation which includes age and gender (Mary et al., NEJM, DOI: 10.1056/PGMYrv7990840)     Lipase 01/29/2022 175  73 - 393 U/L Final     Sodium 01/30/2022 136  133 - 144 mmol/L Final     Potassium 01/30/2022 3.6  3.4 - 5.3 mmol/L Final     Chloride 01/30/2022 104  94 - 109 mmol/L Final     Carbon Dioxide (CO2) 01/30/2022 25  20 - 32 mmol/L Final     Anion Gap 01/30/2022 7  3 - 14 mmol/L Final     Urea Nitrogen 01/30/2022 10  7 - 30 mg/dL Final     Creatinine 01/30/2022 1.01  0.52 - 1.04 mg/dL Final     Calcium 01/30/2022 9.1  8.5 - 10.1 mg/dL Final     Glucose 01/30/2022 86  70 - 99 mg/dL Final     GFR Estimate 01/30/2022 63  >60 mL/min/1.73m2 Final    Effective December 21, 2021 eGFRcr in adults is calculated using the 2021 CKD-EPI creatinine equation which includes age and gender (Mary et al., NEJM, DOI:  10.1056/EEYSka9985667)     Lipase 01/30/2022 128  73 - 393 U/L Final     Lipase fluid 01/30/2022 765  U/L Final     Hold Specimen 01/30/2022 [facility]   Final

## 2022-02-07 NOTE — PROGRESS NOTES
Pt seen for post op during her visit with Dr. Faust.    She reviewed prior episodes of dizziness with Dr. Faust who advised checking orthostatic BPs which were completed and reviewed okay by Dr. Faust. Dr. Faust otherwise advised maintaining hydration and nutrition.     The following post splenectomy vaccination plan confirmed with Dr. Faust:     MenB (Bexsero) first dose here and second dose after 30 days   MenACWY (Menectra) first dose here, second at 8 weeks   Pneumococcal 13 here, then 23 Pneumovax after 8 weeks   Haemophilus influenza (Hib) here-- ActHIB okay    Additionally, Dr. Faust advised CBC today, okay to have resumed anastrazole, and okay to travel home tomorrow. Nahomi will schedule follow up with her PCP when she gets home to review splenectomy. She will also update her oncologist. Post kidney donation labs and follow up reviewed.     Vaccination orders placed, reviewed by Dr. Faust, and CMA notified.

## 2022-02-07 NOTE — LETTER
2/7/2022         RE: Lucero Herrera  [address] [facility] [facility] [address]        Dear Colleague,    Thank you for referring your patient, Lucero Herrera, to the [facility]PL[facility]. Please see a copy of my visit note below.    Transplant Surgery Kidney Donor Progress Note     Medical record number: [id number]  YOB: 1960,   Date of Visit:  02/07/2022  For followup after kidney donation.    Assessment and Recommendations: Ms. Herrera is doing well after kidney donation. We will give post splenectomy vaccinations here today and she will get follow up dose from PCP.  We discussed antibiotic prophylaxis and how that is not our routine but some providers do so.   She will check in with her PCP for further discussion.    We will check orthostatic vs today.  I do not think she is dehydrated or bleeding.  Will also check CBC.    1. Lifting restrictions: 10 lbs until 6 weeks post donation.  2. Followup: 4 weeks  3. Return to work to be determined per patient's progress, expected between 6-8 weeks after surgery.  4.  Post splenectomy vaccinations here, repeat dosing in 4-8 weeks.  5.  Check orhtostatic VS and hgb.    Total time: 25 minutes  Counselling time: 20 minutes    Ian Faust MD, PhD  Associate Professor of Surgery        ------------------------------------------------------------------------------------------    S: Ms. Herrera donate a kidney 10 days ago.  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  Other than that her hospital stay was uneventful.    She now reports feeling well overall.  She denies f/c/s/c/d.  She does have some light headedness or dizziness when turning in bed and standing from lying down.  She is drinking plenty of fluids.        Medications:  Prescription Medications as of 2/7/2022       Rx Number Disp Refills Start End Last Dispensed Date Next Fill Date Owning Pharmacy    acetaminophen (TYLENOL) 325 MG tablet   60 tablet 0 2022    [address]    Sig: Take 2 tablets (650 mg) by mouth every 6 hours as needed for mild pain    Class: E-Prescribe    Route: Oral    ANASTROZOLE PO            Sig: Take 1 mg by mouth daily Holding for surgery     Class: Historical    Route: Oral    bisacodyl (DULCOLAX) 10 MG suppository  10 suppository 0 2022    [address]    Sig: Place 1 suppository (10 mg) rectally daily as needed for constipation    Class: E-Prescribe    Route: Rectal    cetirizine (ZYRTEC) 5 MG tablet            Sig: Take 5 mg by mouth daily Seasonal only    Class: Historical    Route: Oral    fish oil-omega-3 fatty acids 1000 MG capsule            Sig: Take 1 g by mouth 2 times daily    Class: Historical    Route: Oral    Glucosamine Sulfate 1500 MG PACK            Sig: Take 1,500 mg by mouth 2 times daily    Class: Historical    Route: Oral    hypromellose (ARTIFICIAL TEARS) 0.5 % SOLN ophthalmic solution            Si drop every hour as needed for dry eyes    Class: Historical    methocarbamol (ROBAXIN) 500 MG tablet  30 tablet 0 2022    [address]    Sig: Take 1 tablet (500 mg) by mouth 4 times daily as needed for muscle spasms (Pain)    Class: E-Prescribe    Route: Oral    multivitamin w/minerals (MULTI-VITAMIN) tablet            Sig: Take 1 tablet by mouth daily    Class: Historical    Route: Oral    polyethylene glycol (MIRALAX) 17 GM/Dose powder  510 g 0 2022    [address]    Sig: Take 17 g by mouth daily    Class: E-Prescribe    Route: Oral    Probiotic Product (PROBIOTIC-10 PO)            Sig: Take 1 capsule by mouth 2 times daily    Class: Historical    Route: Oral    senna-docusate (SENOKOT-S/PERICOLACE) 8.6-50 MG tablet  60 tablet 0 2022    [place]  [facility] - [address]    Sig: Take 1-2 tablets by mouth 2 times daily as needed for constipation    Class: E-Prescribe    Route: Oral    sodium chloride (OCEAN) 0.65 % nasal spray            Sig: Spray 1 spray into both nostrils daily as needed for congestion    Class: Historical    Route: Both Nostrils    oxyCODONE (ROXICODONE) 5 MG tablet  10 tablet 0 1/30/2022    [facility] [address]    Sig: Take 1-2 tablets (5-10 mg) by mouth every 4 hours as needed for moderate to severe pain    Class: E-Prescribe    Earliest Fill Date: 1/30/2022    Route: Oral      Clinic-Administered Medications as of 2/7/2022       Dose Frequency Start End    haemophilus B polysac-tetanus toxoid (ActHIB) injection 0.5 mL 0.5 mL ONCE 2/7/2022     Route: Intramuscular    meningococcal diptheria polysaccharide (MENACTRA) injection 0.5 mL 0.5 mL ONCE 2/7/2022     Route: Intramuscular    meningococcal vaccine B (Recomb) (BEXSERO) injection 0.5 mL 0.5 mL ONCE 2/7/2022     Route: Intramuscular    pneumococcal (PREVNAR 13) injection 0.5 mL 0.5 mL ONCE 2/7/2022     Admin Instructions: DO NOT give the dose of PCV13 until at least 1 year after the most recent PCV23 dose per CDC guidlines.    Route: Intramuscular          Exam:   Pulse:  [72] 72  BP: (139)/(88) 139/88  SpO2:  [99 %] 99 %  Appearance: in no apparent distress.   Skin: normal  Head and Neck: Normal, no rashes or jaundice  Respiratory: normal respiratory excursions, no audible wheeze  Cardiovascular: RRR  Abdomen: flat, No distention and Surgical scars consistent with history   Extremeties: Edema, none  Neuro: grossly normal       Labs:   Admission on 01/27/2022, Discharged on 01/30/2022   Component Date Value Ref Range Status     Hold Specimen 01/27/2022 Specimen Received   Final     GLUCOSE BY METER POCT 01/27/2022 49* 70 - 99 mg/dL Final    Dr/RN Notified     GLUCOSE BY METER POCT 01/27/2022 54* 70  - 99 mg/dL Final    Dr/RN Notified     GLUCOSE BY METER POCT 01/27/2022 166* 70 - 99 mg/dL Final     Case Report 01/27/2022    Final                    Value:Surgical Pathology Report                         Case: [id number]                                  Authorizing Provider:  Ian Faust MD     Collected:           01/27/2022 10:32 AM          Ordering Location:      MAIN OR                 Received:            01/27/2022 10:55 AM          Pathologist:           Trace Willis MD                                                                 Specimen:    Spleen                                                                                      Final Diagnosis 01/27/2022    Final                    Value:This result contains rich text formatting which cannot be displayed here.     Clinical Information 01/27/2022    Final                    Value:This result contains rich text formatting which cannot be displayed here.     Gross Description 01/27/2022    Final                    Value:This result contains rich text formatting which cannot be displayed here.     Microscopic Description 01/27/2022    Final                    Value:This result contains rich text formatting which cannot be displayed here.     Performing Labs 01/27/2022    Final                    Value:This result contains rich text formatting which cannot be displayed here.     WBC Count 01/27/2022 12.2* 4.0 - 11.0 10e3/uL Final     RBC Count 01/27/2022 3.80  3.80 - 5.20 10e6/uL Final     Hemoglobin 01/27/2022 12.0  11.7 - 15.7 g/dL Final     Hematocrit 01/27/2022 36.1  35.0 - 47.0 % Final     MCV 01/27/2022 95  78 - 100 fL Final     MCH 01/27/2022 31.6  26.5 - 33.0 pg Final     MCHC 01/27/2022 33.2  31.5 - 36.5 g/dL Final     RDW 01/27/2022 13.2  10.0 - 15.0 % Final     Platelet Count 01/27/2022 181  150 - 450 10e3/uL Final     CK 01/27/2022  303* 30 - 225 U/L Final     GLUCOSE BY METER POCT 01/27/2022 193* 70 - 99 mg/dL Final     Color Urine 01/28/2022 Straw  Colorless, Straw, Light Yellow, Yellow Final     Appearance Urine 01/28/2022 Clear  Clear Final     Glucose Urine 01/28/2022 Negative  Negative mg/dL Final     Bilirubin Urine 01/28/2022 Negative  Negative Final     Ketones Urine 01/28/2022 Negative  Negative mg/dL Final     Specific Gravity Urine 01/28/2022 1.004  1.003 - 1.035 Final     Blood Urine 01/28/2022 Negative  Negative Final     pH Urine 01/28/2022 6.0  5.0 - 7.0 Final     Protein Albumin Urine 01/28/2022 Negative  Negative mg/dL Final     Urobilinogen Urine 01/28/2022 Normal  Normal, 2.0 mg/dL Final     Nitrite Urine 01/28/2022 Negative  Negative Final     Leukocyte Esterase Urine 01/28/2022 Negative  Negative Final     RBC Urine 01/28/2022 0  <=2 /HPF Final     WBC Urine 01/28/2022 <1  <=5 /HPF Final     WBC Count 01/27/2022 8.4  4.0 - 11.0 10e3/uL Final     RBC Count 01/27/2022 3.58* 3.80 - 5.20 10e6/uL Final     Hemoglobin 01/27/2022 11.3* 11.7 - 15.7 g/dL Final     Hematocrit 01/27/2022 34.0* 35.0 - 47.0 % Final     MCV 01/27/2022 95  78 - 100 fL Final     MCH 01/27/2022 31.6  26.5 - 33.0 pg Final     MCHC 01/27/2022 33.2  31.5 - 36.5 g/dL Final     RDW 01/27/2022 13.3  10.0 - 15.0 % Final     Platelet Count 01/27/2022 157  150 - 450 10e3/uL Final     Sodium 01/27/2022 137  133 - 144 mmol/L Final     Potassium 01/27/2022 4.1  3.4 - 5.3 mmol/L Final     Chloride 01/27/2022 104  94 - 109 mmol/L Final     Carbon Dioxide (CO2) 01/27/2022 26  20 - 32 mmol/L Final     Anion Gap 01/27/2022 7  3 - 14 mmol/L Final     Urea Nitrogen 01/27/2022 12  7 - 30 mg/dL Final     Creatinine 01/27/2022 1.12* 0.52 - 1.04 mg/dL Final     Calcium 01/27/2022 8.8  8.5 - 10.1 mg/dL Final     Glucose 01/27/2022 158* 70 - 99 mg/dL Final     GFR Estimate 01/27/2022 55* >60 mL/min/1.73m2 Final    Effective December 21, 2021 eGFRcr in adults is calculated using  the 2021 CKD-EPI creatinine equation which includes age and gender (Mary alexander al., NEJ, DOI: 10.1056/JUMVds5083400)     CK 01/28/2022 372* 30 - 225 U/L Final     Lipase 01/28/2022 296  73 - 393 U/L Final     Hold Specimen 01/28/2022 JIC   Final     Lipase fluid 01/28/2022 4,576  U/L Final     WBC Count 01/29/2022 10.2  4.0 - 11.0 10e3/uL Final     RBC Count 01/29/2022 3.52* 3.80 - 5.20 10e6/uL Final     Hemoglobin 01/29/2022 11.1* 11.7 - 15.7 g/dL Final     Hematocrit 01/29/2022 34.1* 35.0 - 47.0 % Final     MCV 01/29/2022 97  78 - 100 fL Final     MCH 01/29/2022 31.5  26.5 - 33.0 pg Final     MCHC 01/29/2022 32.6  31.5 - 36.5 g/dL Final     RDW 01/29/2022 13.6  10.0 - 15.0 % Final     Platelet Count 01/29/2022 164  150 - 450 10e3/uL Final     Sodium 01/29/2022 139  133 - 144 mmol/L Final     Potassium 01/29/2022 3.8  3.4 - 5.3 mmol/L Final     Chloride 01/29/2022 106  94 - 109 mmol/L Final     Carbon Dioxide (CO2) 01/29/2022 28  20 - 32 mmol/L Final     Anion Gap 01/29/2022 5  3 - 14 mmol/L Final     Urea Nitrogen 01/29/2022 11  7 - 30 mg/dL Final     Creatinine 01/29/2022 1.25* 0.52 - 1.04 mg/dL Final     Calcium 01/29/2022 8.8  8.5 - 10.1 mg/dL Final     Glucose 01/29/2022 93  70 - 99 mg/dL Final     GFR Estimate 01/29/2022 48* >60 mL/min/1.73m2 Final    Effective December 21, 2021 eGFRcr in adults is calculated using the 2021 CKD-EPI creatinine equation which includes age and gender (Mary alexander al., NEJ, DOI: 10.1056/MFNFrh6658318)     Lipase 01/29/2022 175  73 - 393 U/L Final     Sodium 01/30/2022 136  133 - 144 mmol/L Final     Potassium 01/30/2022 3.6  3.4 - 5.3 mmol/L Final     Chloride 01/30/2022 104  94 - 109 mmol/L Final     Carbon Dioxide (CO2) 01/30/2022 25  20 - 32 mmol/L Final     Anion Gap 01/30/2022 7  3 - 14 mmol/L Final     Urea Nitrogen 01/30/2022 10  7 - 30 mg/dL Final     Creatinine 01/30/2022 1.01  0.52 - 1.04 mg/dL Final     Calcium 01/30/2022 9.1  8.5 - 10.1 mg/dL Final     Glucose 01/30/2022  86  70 - 99 mg/dL Final     GFR Estimate 01/30/2022 63  >60 mL/min/1.73m2 Final    Effective December 21, 2021 eGFRcr in adults is calculated using the 2021 CKD-EPI creatinine equation which includes age and gender (Mary et al., NEJM, DOI: 10.1056/PPCLkm5025794)     Lipase 01/30/2022 128  73 - 393 U/L Final     Lipase fluid 01/30/2022 765  U/L Final     Hold Specimen 01/30/2022 JIC   Final           Again, thank you for allowing me to participate in the care of your patient.        Sincerely,        Ian Faust MD

## 2022-02-08 ENCOUNTER — DOCUMENTATION ONLY (OUTPATIENT)
Dept: TRANSPLANT | Facility: CLINIC | Age: 62
End: 2022-02-08

## 2022-02-08 NOTE — PROGRESS NOTES
Per Dr. Faust no concern with elevated plt as due to splenectomy, if plt over 1000 would consider starting aspirin.     Nahomi updated of above. She verbalized understanding and will review with her PCP.

## 2022-02-08 NOTE — PROGRESS NOTES
Nahomi confirmed she will review required vaccinations with her PCP. Orders with billing instructions sent to Nahomi.

## 2022-02-08 NOTE — LETTER
REIMBURSEMENT INFORMATION FOR LIVING ORGAN DONORS    LIVING ORGAN DONOR: This form MUST accompany & remain attached to Orders &  given to Provider and/or Healthcare Facility Business Office    PROVIDER/FACILITY INSTRUCTIONS: By accepting to perform these services for living organ  donation, the provider/facility agrees to exclusively bill the [facility] instead of billing  the patient or any insurance provider and agrees to accept the reimbursement, as described below, as  payment in full for services rendered.    PROVIDER BILLING INSTRUCTIONS:  1. [facility] agrees to pay for all authorized testing ordered by our transplant  program that is related to living organ donation. The attached orders/tests are part of the donor  Evaluation.    2. Do not bill the donor or donor's insurance. Send an itemized invoice, claim or statement to:    [facility]  Transplant Finance/Donor Billing  [address] [place]  [place] [address]    3. Billing statements must include the patient first and last name, date of birth, the CPT procedure code  and date of service. Please bill service on the ORIGINAL UBO4 or 1500 with appropriate CPT/HCPCS  codes along with W-9 and send to the above address to insure timely reimbursement.    4. Claims should be submitted no later than six months from the date when services are rendered.  Claims denied for late submission should not be billed to the donor or their private insurance carrier.    5. [facility] will reimburse all charges at 100% of the Medicare Fee Schedule as  defined in the Code of Federal Regulations (CFR) 42, Chapter IV. This is to be considered payment  in full. [facility], the patient, and/or the patient's insurance are NOT to  be billed any balance, co-payment, or deductible, per Medicare regulations. **ATTN: Facility  providing services for attached/enclosed Living Donor Orders; If facility does  NOT AGREE to  the reimbursement rate stated above, PLEASE DENY SERVICES & refer Donor/patient back to  their [facility] Coordinator Transplant [facility].    6. Patients are NOT to make any payments at the time of service.    Please forward this information to your billing department so that a donor account can be set up with  these instructions.    Should you have any questions, please contact the Donor Billing office at [phone],  Monday - Friday, 8:00 a.m. to 4:00 p.m.   Thank you for your assistance.

## 2022-02-08 NOTE — LETTER
PHYSICIAN ORDERS      DATE & TIME ISSUED: 2022 8:59 AM  PATIENT NAME: Lucero Herrera   : 1960     [facility] MR# [if applicable]: [id number]     DIAGNOSIS:  kidney donor  ICD-10 CODE: Z52.4     Please administer the following post splenectomy vaccinations:     -MenB (Bexsero)- first dose administered 22 and second dose due after 30 days   -due after 3/9/22  -MenACWY (Menectra) first dose administered 22, second due at 8 weeks   - due 22  -Pneumococcal 13 administered 22, then 23 Pneumovax after 8 weeks    - 23 Pneumovax due after 22    Any questions please call: Oriana Taylor at [phone]      .

## 2022-02-14 ENCOUNTER — TELEPHONE (OUTPATIENT)
Dept: TRANSPLANT | Facility: CLINIC | Age: 62
End: 2022-02-14

## 2022-02-14 NOTE — TELEPHONE ENCOUNTER
Post-hospital outreach call made to check in post kidney donation.     Nahomi reports overall feeling pretty good.    Incision: CDI, no redness, warmth, or swelling, or drainage steri strips starting to fall off    Pain: tylenol prn at HS only  Bowels: have daily, regular BMs, using senakot and miralax, planning to wean off stool softeners   Appetite: improving, tolerating meals  Fluids: taking adequate fluids  Urinary: no issues  Activity: energy level slightly improved, alternating periods of activity and rest     Reviewed: abdominal precautions, lifting restrictions.    She is the the process of setting up follow up with her PCP including arranging post splenectomy vaccinations (orders sent).     Reminded Nahomi about [facility] follow-up requirements.     She knows how to get in touch with me or an on call coordinator with any questions or concerns.

## 2022-02-14 NOTE — LETTER
Physician Order for Living Kidney Donor    Date and Time Issued: 2022 10:42 AM    Patient Name: Lucero Herrera Diagnosis: Living Organ Donor   : 1960 ICD-10 Code: [x] Z52.4   Mindy MRN: [id number] Expires: 22     As discussed in your evaluation prior to donation, kidney donors are to have labs drawn at specific intervals. You are due for your labs now. You may have these labs drawn at your primary care provider's lab, or call [phone] to make an appointment at the [facility]'s [place] lab. You will be contacted with any abnormal results requiring follow up.     [x] Serum Creatinine      [x] Urinalysis     [x] Total protein random urine    [x] Microalbumin quantitative random urine    Please fax results to [phone]       For questions, please call [phone] ([phone])    Lab Billing Information   Dear Provider,     On behalf of the Solid Organ Transplant Office at the [facility], thank you for providing care to this patient, who is an organ donor. To ensure that our donors receive the necessary care, and in order for our program to gather required post donation information and test results, [facility] will pay for all donor related testing that is authorized and ordered by our transplant program.     The transplant program at [facility] will reimburse all charges at 100% of the Medicare Fee Schedule as defined in the Code of Federal Regulations (CFR) 42, Chapter IV. This is to be considered payment in full. [facility], the patient, and/or the patient's insurance are NOT to be billed any balance, co-payment, or deductible, per Medicare regulations.    To receive payment, please send claims to:  [facility]  [facility] - Transplant Finance/Donor Billing  [address] [place] [address] Edison Butler MD  Surgical Director, Kidney Transplantation

## 2022-02-14 NOTE — LETTER
Physician Order for Living Kidney Donor    Date and Time Issued: 2022 10:41 AM    Patient Name: Lucero Herrera Diagnosis: Living Organ Donor   : 1960 ICD-10 Code: [x] Z52.4   Mindy MRN: [id number] Expires: 22     As discussed in your evaluation prior to donation, kidney donors are to have labs drawn at specific intervals. You are due for your labs now. You may have these labs drawn at your primary care provider's lab, or call [phone] to make an appointment at the [facility]'s [place] lab. You will be contacted with any abnormal results requiring follow up.     [x] Serum Creatinine      [x] Urinalysis     [x] Total protein random urine    [x] Microalbumin quantitative random urine    Please fax results to [phone]       For questions, please call [phone] ([phone])    Lab Billing Information   Dear Provider,     On behalf of the Solid Organ Transplant Office at the [facility], thank you for providing care to this patient, who is an organ donor. To ensure that our donors receive the necessary care, and in order for our program to gather required post donation information and test results, [facility] will pay for all donor related testing that is authorized and ordered by our transplant program.     The transplant program at [facility] will reimburse all charges at 100% of the Medicare Fee Schedule as defined in the Code of Federal Regulations (CFR) 42, Chapter IV. This is to be considered payment in full. [facility], the patient, and/or the patient's insurance are NOT to be billed any balance, co-payment, or deductible, per Medicare regulations.    To receive payment, please send claims to:  [facility]  [facility] - Transplant Finance/Donor Billing  [address] [place] [address] Edison Butler MD  Surgical Director, Kidney Transplantation

## 2022-02-14 NOTE — LETTER
Physician Order for Living Kidney Donor    Date and Time Issued: 2022 10:39 AM    Patient Name: Lucero Herrera Diagnosis: Living Organ Donor   : 1960 ICD-10 Code: [x] Z52.4   Mindy MRN: [id number] Expires: 22     As discussed in your evaluation prior to donation, kidney donors are to have labs drawn at specific intervals. You are due for your labs 6 weeks after donation, ~3/10/22. You may have these labs drawn at your primary care provider's lab, or call [phone] to make an appointment at the [facility]'s [place] lab. You will be contacted with any abnormal results requiring follow up.     [x] Serum Creatinine      [x] Urinalysis     [x] Total protein random urine    [x] Microalbumin quantitative random urine    Please fax results to [phone]       For questions, please call [phone] ([phone])    Lab Billing Information   Dear Provider,     On behalf of the Solid Organ Transplant Office at the [facility], thank you for providing care to this patient, who is an organ donor. To ensure that our donors receive the necessary care, and in order for our program to gather required post donation information and test results, [facility] will pay for all donor related testing that is authorized and ordered by our transplant program.     The transplant program at [facility] will reimburse all charges at 100% of the Medicare Fee Schedule as defined in the Code of Federal Regulations (CFR) 42, Chapter IV. This is to be considered payment in full. [facility], the patient, and/or the patient's insurance are NOT to be billed any balance, co-payment, or deductible, per Medicare regulations.    To receive payment, please send claims to:  [facility]  [facility] - Transplant Finance/Donor Billing  [address] [place] [address] Edison Butler MD  Surgical Director, Kidney Transplantation

## 2022-03-07 ENCOUNTER — TELEPHONE (OUTPATIENT)
Dept: TRANSPLANT | Facility: CLINIC | Age: 62
End: 2022-03-07

## 2022-03-07 NOTE — TELEPHONE ENCOUNTER
Nahomi called reporting that ~ 1 week ago she started having some sharp pain to the right of her kidney donation incision. Pain has persisted and may be getting slightly worse. She donated her left kidney. Describes pain has sharp or stabbing with activity, she does not notice it when she is resting. Abd otherwise normal, no bulges or changes to the contour, incisions healing. She denies any other new or change in symptoms, no fevers, no nausea, having regular BMs, denies feeling constipated at all, tolerating oral intake. Tried tylenol with minimal relieve.     Message sent to Dr. Faust and Rain Arvizu, IRVIN, will update Nahomi with recommendations.

## 2022-03-07 NOTE — TELEPHONE ENCOUNTER
Dr. Faust recommended seeing her PCP in clinic for assessment and consideration for imaging.     Nahomi updated of above. She verbalized understanding and comfort with plan and confirmed she will schedule with her PCP and update me once she has completed this follow up.

## 2022-03-10 ENCOUNTER — DOCUMENTATION ONLY (OUTPATIENT)
Dept: TRANSPLANT | Facility: CLINIC | Age: 62
End: 2022-03-10

## 2022-03-10 NOTE — PROGRESS NOTES
Message received from Nahomi reporting she saw her PCP regarding her abd pain. PCP felt it might be a hematoma not a hernia. Overall the pain is improving so Nahomi is planning to try heat and message for now. If pain does not continue to improve plan will be for a CT scan.     Above reviewed with Dr. Faust who agreed with this plan. Nahomi updated.

## 2022-03-16 ENCOUNTER — DOCUMENTATION ONLY (OUTPATIENT)
Dept: TRANSPLANT | Facility: CLINIC | Age: 62
End: 2022-03-16

## 2022-03-16 NOTE — PROGRESS NOTES
Below message received from Nahomi. She knows how to reach me with any additional concerns or questions.     Hi Oriana,  I just wanted to let you know that the pain in my abdomen is going away with the heat and massage.  There will be no need for more testing.  Thanks!  Nahomi

## 2022-05-16 ENCOUNTER — TELEPHONE (OUTPATIENT)
Dept: TRANSPLANT | Facility: CLINIC | Age: 62
End: 2022-05-16

## 2022-05-16 NOTE — TELEPHONE ENCOUNTER
Return call received from Nahomi. She reports her health is back to baseline, she feels great, no complaints or concerns. 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     Nahomi acknowledged her sister (domingo) lost the kidney graft. She confirmed she knows that they gave it the best chance they could. She also acknowledged wanting a different life for her sister. Nahomi denied need for any additional support related at this time, but knows she can call at any time. Thanked Nahomi for being a donor.

## 2022-07-17 ENCOUNTER — HEALTH MAINTENANCE LETTER (OUTPATIENT)
Age: 62
End: 2022-07-17

## 2022-07-27 ENCOUNTER — DOCUMENTATION ONLY (OUTPATIENT)
Dept: TRANSPLANT | Facility: CLINIC | Age: 62
End: 2022-07-27

## 2022-07-27 DIAGNOSIS — Z52.4 KIDNEY DONOR: Primary | ICD-10-CM

## 2022-07-27 NOTE — PROGRESS NOTES
- safety labs for SSZ due next month.  - discussed s/e and risks of Author  and provided pt handout. Resent the PostOp lab orders with instructions to do NOW.Prep orders given.Asked her to ask her clinic to send results directly to us.

## 2022-07-28 ENCOUNTER — TELEPHONE (OUTPATIENT)
Dept: TRANSPLANT | Facility: CLINIC | Age: 62
End: 2022-07-28

## 2022-07-28 NOTE — TELEPHONE ENCOUNTER
"No PostOp labs here yet. Received message from Johana stating she did them 7/18 and the results were faxed to us.Reasked Johana what clinic she went to--this is her reply:  \" my Doctor is Dr Beard from [facility] and [facility].  The number is [phone].  It seems like they always have trouble sending the results- sorry!  Nahomi\"  Now called clinic and was told they are faxing to us right now.    "

## 2022-08-02 ENCOUNTER — TELEPHONE (OUTPATIENT)
Dept: TRANSPLANT | Facility: CLINIC | Age: 62
End: 2022-08-02

## 2022-08-02 DIAGNOSIS — Z52.4 KIDNEY DONOR: Primary | ICD-10-CM

## 2022-08-02 NOTE — LETTER
Physician Order for Living Kidney Donor    Date and Time Issued: 2022 2:56 PM    Patient Name: Lucero Herrera Diagnosis: Living Organ Donor   : 1960 ICD-10 Code: [x] Z52.4   Mindy MRN: [id number] Expires: 22     Please complete the following lab:      [x] Urinalysis with Micro and Reflex to Culture       Please fax results ASAP to [phone]       For questions, please call Oriana Taylor RN at [phone]    Lab Billing Information   Dear Provider,     On behalf of the Solid Organ Transplant Office at the [facility], thank you for providing care to this patient, who is an organ donor. To ensure that our donors receive the necessary care, and in order for our program to gather required post donation information and test results, [facility] will pay for all donor related testing that is authorized and ordered by our transplant program.     The transplant program at [facility] will reimburse all charges at 100% of the Medicare Fee Schedule as defined in the Code of Federal Regulations (CFR) 42, Chapter IV. This is to be considered payment in full. [facility], the patient, and/or the patient's insurance are NOT to be billed any balance, co-payment, or deductible, per Medicare regulations.    To receive payment, please send claims to:  [facility]  Rev. Cycle - Transplant Finance/Donor Billing  [address] [place] [address] Edison Butler MD  Surgical Director, Kidney Transplantation

## 2022-08-02 NOTE — TELEPHONE ENCOUNTER
Multiple calls made to obtain recent post kidney donation labs. Per clinic staff creatinine was 1.02, BMP result being faxed.     Outreach call made to Nahomi. Nahomi denied any changes to her health. Plan established to repeat UA next week. Order placed and sent to Nahomi.

## 2022-08-02 NOTE — LETTER
Physician Order for Living Kidney Donor    Date and Time Issued: 2022 2:37 PM    Patient Name: Lucero Herrera Diagnosis: Living Organ Donor   : 1960 ICD-10 Code: [x] Z52.4   Mindy MRN: [id number] Expires: 22     Please complete the following lab:         [x] urinalysis with micro      Please fax results ASAP to [phone]       For questions, please call [phone] ([phone])    Lab Billing Information   Dear Provider,     On behalf of the Solid Organ Transplant Office at the [facility], thank you for providing care to this patient, who is an organ donor. To ensure that our donors receive the necessary care, and in order for our program to gather required post donation information and test results, [facility] will pay for all donor related testing that is authorized and ordered by our transplant program.     The transplant program at [facility] will reimburse all charges at 100% of the Medicare Fee Schedule as defined in the Code of Federal Regulations (CFR) 42, Chapter IV. This is to be considered payment in full. [facility], the patient, and/or the patient's insurance are NOT to be billed any balance, co-payment, or deductible, per Medicare regulations.    To receive payment, please send claims to:  [facility]  Rev. Cycle - Transplant Finance/Donor Billing  [address] [place] [address] Edison Butler MD  Surgical Director, Kidney Transplantation

## 2022-08-11 ENCOUNTER — DOCUMENTATION ONLY (OUTPATIENT)
Dept: TRANSPLANT | Facility: CLINIC | Age: 62
End: 2022-08-11

## 2022-08-11 NOTE — PROGRESS NOTES
Repeat UA YUNI. Nahomi updated and reviewed when next set up post donation labs due. Nahomi knows how to reach me with questions.

## 2022-09-24 ENCOUNTER — HEALTH MAINTENANCE LETTER (OUTPATIENT)
Age: 62
End: 2022-09-24

## 2022-12-19 ENCOUNTER — DOCUMENTATION ONLY (OUTPATIENT)
Dept: TRANSPLANT | Facility: CLINIC | Age: 62
End: 2022-12-19

## 2022-12-19 DIAGNOSIS — Z52.4 KIDNEY DONOR: Primary | ICD-10-CM

## 2023-01-25 ENCOUNTER — TELEPHONE (OUTPATIENT)
Dept: TRANSPLANT | Facility: CLINIC | Age: 63
End: 2023-01-25

## 2023-01-25 NOTE — TELEPHONE ENCOUNTER
"Received this Email message from Nahomi:  \"I'm not sure who to follow up with - I keep getting messages to have my 1 year blood work done but it was done on Jan 6th and faxed to you on Jan 9th.  Would you be able to confirm that you received it?  Thank you!  Nahomi\"  No results in CareEverywhere.Coordinator also does not see them here.Message to Nahomi asking if she is able to send or ask the clinic to send to us.    "

## 2023-01-31 ENCOUNTER — DOCUMENTATION ONLY (OUTPATIENT)
Dept: TRANSPLANT | Facility: CLINIC | Age: 63
End: 2023-01-31

## 2023-01-31 NOTE — PROGRESS NOTES
Called Nahomi's clinic at [phone] and asked for Johana's lab results. They are faxing results now. Stated she could not Email the results.

## 2023-08-05 ENCOUNTER — HEALTH MAINTENANCE LETTER (OUTPATIENT)
Age: 63
End: 2023-08-05

## 2023-11-27 ENCOUNTER — DOCUMENTATION ONLY (OUTPATIENT)
Dept: TRANSPLANT | Facility: CLINIC | Age: 63
End: 2023-11-27

## 2023-11-27 DIAGNOSIS — Z52.4 KIDNEY DONOR: Primary | ICD-10-CM

## 2023-12-23 ENCOUNTER — HEALTH MAINTENANCE LETTER (OUTPATIENT)
Age: 63
End: 2023-12-23

## 2024-05-05 ENCOUNTER — HEALTH MAINTENANCE LETTER (OUTPATIENT)
Age: 64
End: 2024-05-05

## 2025-01-12 ENCOUNTER — HEALTH MAINTENANCE LETTER (OUTPATIENT)
Age: 65
End: 2025-01-12

## (undated) DEVICE — ESU HARMONIC LAPAROSCOPIC SHEARS HD 1000I 36CM HARHD36

## (undated) DEVICE — TUBING INSUFFLATION W/FILTER CPC TO LUER 620-030-301

## (undated) DEVICE — ESU ENDO SCISSORS 5MM CVD 5DCS

## (undated) DEVICE — PREP CHLORAPREP 26ML TINTED ORANGE  260815

## (undated) DEVICE — DRAPE SLUSH/WARMER 66X44" ORS-320

## (undated) DEVICE — ANTIFOG SOLUTION W/FOAM PAD 31142527

## (undated) DEVICE — STRAP UNIVERSAL POSITIONING 2-PIECE 4X47X76" 91-287

## (undated) DEVICE — SU SILK 3-0 SH 30" K832H

## (undated) DEVICE — SURGICEL ABSORBABLE HEMOSTAT SNOW 4"X4" 2083

## (undated) DEVICE — ENDO TROCAR SLEEVE KII Z-THREADED 12X100MM CTS22

## (undated) DEVICE — SOL NACL 0.9% INJ 1000ML BAG 2B1324X

## (undated) DEVICE — GLOVE SENSICARE 7.0 MSG1070 LATEX FREE

## (undated) DEVICE — SU ETHILON 3-0 PS-1 18" 1663H

## (undated) DEVICE — ENDO TROCAR FIRST ENTRY KII FIOS Z-THRD 12X100MM CTF73

## (undated) DEVICE — SUCTION MANIFOLD NEPTUNE 2 SYS 4 PORT 0702-020-000

## (undated) DEVICE — ESU GROUND PAD ADULT W/CORD E7507

## (undated) DEVICE — Device

## (undated) DEVICE — LINEN TOWEL PACK X6 WHITE 5487

## (undated) DEVICE — SOL WATER IRRIG 1000ML BOTTLE 2F7114

## (undated) DEVICE — NDL ANGIOCATH 20GA 1.25" 4056

## (undated) DEVICE — SU PDS II 0 TP-1 60" Z991G

## (undated) DEVICE — DRSG PRIMAPORE 02X3" 7133

## (undated) DEVICE — DRSG PRIMAPORE 03 1/8X6" 66000318

## (undated) DEVICE — LINEN GOWN XLG 5407

## (undated) DEVICE — DRAIN JACKSON PRATT RESERVOIR 100ML SU130-1305

## (undated) DEVICE — SU SILK 2-0 TIE 12X30" A305H

## (undated) DEVICE — SU MONOCRYL 4-0 PS-2 18" UND Y496G

## (undated) DEVICE — DRAPE ISOLATION BAG 1003

## (undated) DEVICE — DRSG STERI STRIP 1/2X4" R1547

## (undated) DEVICE — LINEN TOWEL PACK X5 5464

## (undated) DEVICE — SU VICRYL 0 TIE 54" J608H

## (undated) DEVICE — NDL ANGIOCATH 18GA 1 3/4" 4054

## (undated) DEVICE — NDL ANGIOCATH 14GA 1.25" 4048

## (undated) DEVICE — SU SILK 4-0 TIE 12X30" A303H

## (undated) DEVICE — SU PROLENE 5-0 RB-1DA 36"  8556H

## (undated) DEVICE — STPL POWERED ECHELON VASC 35MM PVE35A

## (undated) DEVICE — BASIN SET SINGLE STERILE 13752-624

## (undated) DEVICE — TUBING IRRIG CYSTO/BLADDER SET 81" LF 2C4040

## (undated) DEVICE — BNDG ABDOMINAL BINDER 9X45-62" 79-89071

## (undated) DEVICE — RX SURGIFLO HEMOSTATIC MATRIX W/THROMBIN 8ML 2994

## (undated) DEVICE — CATH TRAY FOLEY SURESTEP 16FR W/URNE MTR STLK LATEX A303316A

## (undated) DEVICE — SU SILK 3-0 TIE 12X30" A304H

## (undated) DEVICE — CLIP ENDO HEMO-LOC PURPLE LG 544240

## (undated) DEVICE — ENDO APPLICATOR SURGIFLO PLASMA COBLATION MS1995

## (undated) DEVICE — SUCTION IRR STRYKERFLOW II W/TIP 250-070-520

## (undated) DEVICE — STPL POWERED ECHELON 45MM PSEE45A

## (undated) DEVICE — DRAIN JACKSON PRATT CHANNEL 19FR ROUND HUBLESS SIL JP-2230

## (undated) DEVICE — DRSG ABDOMINAL 07 1/2X8" 7197D

## (undated) DEVICE — SU VICRYL 3-0 SH 27" UND J416H

## (undated) DEVICE — ENDO CLOSING KIT ENDOCLOSE 173022

## (undated) DEVICE — ADH SKIN CLOSURE PREMIERPRO EXOFIN 1.0ML 3470

## (undated) DEVICE — SOL ADH LIQUID BENZOIN SWAB 0.6ML C1544

## (undated) DEVICE — WIPES FOLEY CARE SURESTEP PROVON DFC100

## (undated) DEVICE — SOL NACL 0.9% IRRIG 1000ML BOTTLE 2F7124

## (undated) RX ORDER — METHOCARBAMOL 500 MG/1
TABLET, FILM COATED ORAL
Status: DISPENSED
Start: 2022-01-27

## (undated) RX ORDER — FENTANYL CITRATE 50 UG/ML
INJECTION, SOLUTION INTRAMUSCULAR; INTRAVENOUS
Status: DISPENSED
Start: 2022-01-27

## (undated) RX ORDER — KETOROLAC TROMETHAMINE 30 MG/ML
INJECTION, SOLUTION INTRAMUSCULAR; INTRAVENOUS
Status: DISPENSED
Start: 2022-01-27

## (undated) RX ORDER — PROPOFOL 10 MG/ML
INJECTION, EMULSION INTRAVENOUS
Status: DISPENSED
Start: 2022-01-27

## (undated) RX ORDER — HYDROMORPHONE HCL IN WATER/PF 6 MG/30 ML
PATIENT CONTROLLED ANALGESIA SYRINGE INTRAVENOUS
Status: DISPENSED
Start: 2022-01-27

## (undated) RX ORDER — ACETAMINOPHEN 325 MG/1
TABLET ORAL
Status: DISPENSED
Start: 2022-01-27

## (undated) RX ORDER — DEXTROSE MONOHYDRATE 25 G/50ML
INJECTION, SOLUTION INTRAVENOUS
Status: DISPENSED
Start: 2022-01-27

## (undated) RX ORDER — CARDIOPLEG/ORGAN PRESERV NO.1 9-198-2-1
BOTTLE PERFUSION
Status: DISPENSED
Start: 2022-01-27

## (undated) RX ORDER — ONDANSETRON 2 MG/ML
INJECTION INTRAMUSCULAR; INTRAVENOUS
Status: DISPENSED
Start: 2022-01-27

## (undated) RX ORDER — DEXTROSE, SODIUM CHLORIDE, SODIUM LACTATE, POTASSIUM CHLORIDE, AND CALCIUM CHLORIDE 5; .6; .31; .03; .02 G/100ML; G/100ML; G/100ML; G/100ML; G/100ML
INJECTION, SOLUTION INTRAVENOUS
Status: DISPENSED
Start: 2022-01-27

## (undated) RX ORDER — HEPARIN SODIUM 5000 [USP'U]/.5ML
INJECTION, SOLUTION INTRAVENOUS; SUBCUTANEOUS
Status: DISPENSED
Start: 2022-01-27

## (undated) RX ORDER — ROCURONIUM BROMIDE 50 MG/5 ML
SYRINGE (ML) INTRAVENOUS
Status: DISPENSED
Start: 2022-01-27

## (undated) RX ORDER — OXYCODONE HYDROCHLORIDE 10 MG/1
TABLET ORAL
Status: DISPENSED
Start: 2022-01-27

## (undated) RX ORDER — FENTANYL CITRATE-0.9 % NACL/PF 10 MCG/ML
PLASTIC BAG, INJECTION (ML) INTRAVENOUS
Status: DISPENSED
Start: 2022-01-27

## (undated) RX ORDER — LIDOCAINE HYDROCHLORIDE 20 MG/ML
INJECTION, SOLUTION EPIDURAL; INFILTRATION; INTRACAUDAL; PERINEURAL
Status: DISPENSED
Start: 2022-01-27

## (undated) RX ORDER — GABAPENTIN 300 MG/1
CAPSULE ORAL
Status: DISPENSED
Start: 2022-01-27

## (undated) RX ORDER — OXYCODONE HYDROCHLORIDE 5 MG/1
TABLET ORAL
Status: DISPENSED
Start: 2022-01-27

## (undated) RX ORDER — DEXAMETHASONE SODIUM PHOSPHATE 4 MG/ML
INJECTION, SOLUTION INTRA-ARTICULAR; INTRALESIONAL; INTRAMUSCULAR; INTRAVENOUS; SOFT TISSUE
Status: DISPENSED
Start: 2022-01-27

## (undated) RX ORDER — GLYCOPYRROLATE 0.2 MG/ML
INJECTION, SOLUTION INTRAMUSCULAR; INTRAVENOUS
Status: DISPENSED
Start: 2022-01-27

## (undated) RX ORDER — FLUMAZENIL 0.1 MG/ML
INJECTION, SOLUTION INTRAVENOUS
Status: DISPENSED
Start: 2022-01-27

## (undated) RX ORDER — CEFUROXIME SODIUM 1.5 G/16ML
INJECTION, POWDER, FOR SOLUTION INTRAVENOUS
Status: DISPENSED
Start: 2022-01-27

## (undated) RX ORDER — EPHEDRINE SULFATE 50 MG/ML
INJECTION, SOLUTION INTRAMUSCULAR; INTRAVENOUS; SUBCUTANEOUS
Status: DISPENSED
Start: 2022-01-27

## (undated) RX ORDER — HEPARIN SODIUM 1000 [USP'U]/ML
INJECTION, SOLUTION INTRAVENOUS; SUBCUTANEOUS
Status: DISPENSED
Start: 2022-01-27

## (undated) RX ORDER — MAGNESIUM SULFATE HEPTAHYDRATE 40 MG/ML
INJECTION, SOLUTION INTRAVENOUS
Status: DISPENSED
Start: 2022-01-27